# Patient Record
Sex: MALE | Race: WHITE | NOT HISPANIC OR LATINO | URBAN - METROPOLITAN AREA
[De-identification: names, ages, dates, MRNs, and addresses within clinical notes are randomized per-mention and may not be internally consistent; named-entity substitution may affect disease eponyms.]

---

## 2018-06-22 ENCOUNTER — EMERGENCY (EMERGENCY)
Facility: HOSPITAL | Age: 68
LOS: 0 days | Discharge: HOME | End: 2018-06-22
Attending: EMERGENCY MEDICINE | Admitting: INTERNAL MEDICINE

## 2018-06-22 VITALS — SYSTOLIC BLOOD PRESSURE: 167 MMHG | DIASTOLIC BLOOD PRESSURE: 71 MMHG | HEART RATE: 59 BPM

## 2018-06-22 VITALS
RESPIRATION RATE: 20 BRPM | DIASTOLIC BLOOD PRESSURE: 90 MMHG | HEART RATE: 69 BPM | OXYGEN SATURATION: 95 % | TEMPERATURE: 100 F | SYSTOLIC BLOOD PRESSURE: 175 MMHG

## 2018-06-22 DIAGNOSIS — M62.81 MUSCLE WEAKNESS (GENERALIZED): ICD-10-CM

## 2018-06-22 DIAGNOSIS — Z79.899 OTHER LONG TERM (CURRENT) DRUG THERAPY: ICD-10-CM

## 2018-06-22 DIAGNOSIS — R42 DIZZINESS AND GIDDINESS: ICD-10-CM

## 2018-06-22 DIAGNOSIS — I10 ESSENTIAL (PRIMARY) HYPERTENSION: ICD-10-CM

## 2018-06-22 DIAGNOSIS — E78.00 PURE HYPERCHOLESTEROLEMIA, UNSPECIFIED: ICD-10-CM

## 2018-06-22 DIAGNOSIS — E78.4 OTHER HYPERLIPIDEMIA: ICD-10-CM

## 2018-06-22 DIAGNOSIS — Z88.0 ALLERGY STATUS TO PENICILLIN: ICD-10-CM

## 2018-06-22 DIAGNOSIS — F17.200 NICOTINE DEPENDENCE, UNSPECIFIED, UNCOMPLICATED: ICD-10-CM

## 2018-06-22 LAB
ALBUMIN SERPL ELPH-MCNC: 4.1 G/DL — SIGNIFICANT CHANGE UP (ref 3.5–5.2)
ALP SERPL-CCNC: 56 U/L — SIGNIFICANT CHANGE UP (ref 30–115)
ALT FLD-CCNC: 18 U/L — SIGNIFICANT CHANGE UP (ref 0–41)
ANION GAP SERPL CALC-SCNC: 12 MMOL/L — SIGNIFICANT CHANGE UP (ref 7–14)
APPEARANCE UR: CLEAR — SIGNIFICANT CHANGE UP
AST SERPL-CCNC: 14 U/L — SIGNIFICANT CHANGE UP (ref 0–41)
BASOPHILS # BLD AUTO: 0.03 K/UL — SIGNIFICANT CHANGE UP (ref 0–0.2)
BASOPHILS NFR BLD AUTO: 0.5 % — SIGNIFICANT CHANGE UP (ref 0–1)
BILIRUB SERPL-MCNC: 0.4 MG/DL — SIGNIFICANT CHANGE UP (ref 0.2–1.2)
BILIRUB UR-MCNC: NEGATIVE — SIGNIFICANT CHANGE UP
BUN SERPL-MCNC: 15 MG/DL — SIGNIFICANT CHANGE UP (ref 10–20)
CALCIUM SERPL-MCNC: 9 MG/DL — SIGNIFICANT CHANGE UP (ref 8.5–10.1)
CHLORIDE SERPL-SCNC: 99 MMOL/L — SIGNIFICANT CHANGE UP (ref 98–110)
CK SERPL-CCNC: 73 U/L — SIGNIFICANT CHANGE UP (ref 0–225)
CO2 SERPL-SCNC: 30 MMOL/L — SIGNIFICANT CHANGE UP (ref 17–32)
COLOR SPEC: YELLOW — SIGNIFICANT CHANGE UP
CREAT SERPL-MCNC: 0.8 MG/DL — SIGNIFICANT CHANGE UP (ref 0.7–1.5)
DIFF PNL FLD: NEGATIVE — SIGNIFICANT CHANGE UP
EOSINOPHIL # BLD AUTO: 0.05 K/UL — SIGNIFICANT CHANGE UP (ref 0–0.7)
EOSINOPHIL NFR BLD AUTO: 0.8 % — SIGNIFICANT CHANGE UP (ref 0–8)
GLUCOSE SERPL-MCNC: 93 MG/DL — SIGNIFICANT CHANGE UP (ref 70–99)
GLUCOSE UR QL: NEGATIVE MG/DL — SIGNIFICANT CHANGE UP
HCT VFR BLD CALC: 48.5 % — SIGNIFICANT CHANGE UP (ref 42–52)
HGB BLD-MCNC: 17 G/DL — SIGNIFICANT CHANGE UP (ref 14–18)
IMM GRANULOCYTES NFR BLD AUTO: 0.5 % — HIGH (ref 0.1–0.3)
KETONES UR-MCNC: NEGATIVE — SIGNIFICANT CHANGE UP
LEUKOCYTE ESTERASE UR-ACNC: NEGATIVE — SIGNIFICANT CHANGE UP
LYMPHOCYTES # BLD AUTO: 1.5 K/UL — SIGNIFICANT CHANGE UP (ref 1.2–3.4)
LYMPHOCYTES # BLD AUTO: 25.2 % — SIGNIFICANT CHANGE UP (ref 20.5–51.1)
MCHC RBC-ENTMCNC: 29.7 PG — SIGNIFICANT CHANGE UP (ref 27–31)
MCHC RBC-ENTMCNC: 35.1 G/DL — SIGNIFICANT CHANGE UP (ref 32–37)
MCV RBC AUTO: 84.8 FL — SIGNIFICANT CHANGE UP (ref 80–94)
MONOCYTES # BLD AUTO: 0.42 K/UL — SIGNIFICANT CHANGE UP (ref 0.1–0.6)
MONOCYTES NFR BLD AUTO: 7 % — SIGNIFICANT CHANGE UP (ref 1.7–9.3)
NEUTROPHILS # BLD AUTO: 3.93 K/UL — SIGNIFICANT CHANGE UP (ref 1.4–6.5)
NEUTROPHILS NFR BLD AUTO: 66 % — SIGNIFICANT CHANGE UP (ref 42.2–75.2)
NITRITE UR-MCNC: NEGATIVE — SIGNIFICANT CHANGE UP
PH UR: 7 — SIGNIFICANT CHANGE UP (ref 5–8)
PLATELET # BLD AUTO: 168 K/UL — SIGNIFICANT CHANGE UP (ref 130–400)
POTASSIUM SERPL-MCNC: 3.8 MMOL/L — SIGNIFICANT CHANGE UP (ref 3.5–5)
POTASSIUM SERPL-SCNC: 3.8 MMOL/L — SIGNIFICANT CHANGE UP (ref 3.5–5)
PROT SERPL-MCNC: 6.6 G/DL — SIGNIFICANT CHANGE UP (ref 6–8)
PROT UR-MCNC: NEGATIVE MG/DL — SIGNIFICANT CHANGE UP
RBC # BLD: 5.72 M/UL — SIGNIFICANT CHANGE UP (ref 4.7–6.1)
RBC # FLD: 12.7 % — SIGNIFICANT CHANGE UP (ref 11.5–14.5)
SODIUM SERPL-SCNC: 141 MMOL/L — SIGNIFICANT CHANGE UP (ref 135–146)
SP GR SPEC: 1.01 — SIGNIFICANT CHANGE UP (ref 1.01–1.03)
TROPONIN T SERPL-MCNC: <0.01 NG/ML — SIGNIFICANT CHANGE UP
UROBILINOGEN FLD QL: 0.2 MG/DL — SIGNIFICANT CHANGE UP (ref 0.2–0.2)
WBC # BLD: 5.96 K/UL — SIGNIFICANT CHANGE UP (ref 4.8–10.8)
WBC # FLD AUTO: 5.96 K/UL — SIGNIFICANT CHANGE UP (ref 4.8–10.8)

## 2018-06-22 RX ORDER — SODIUM CHLORIDE 9 MG/ML
1000 INJECTION, SOLUTION INTRAVENOUS ONCE
Qty: 0 | Refills: 0 | Status: COMPLETED | OUTPATIENT
Start: 2018-06-22 | End: 2018-06-22

## 2018-06-22 RX ADMIN — SODIUM CHLORIDE 1000 MILLILITER(S): 9 INJECTION, SOLUTION INTRAVENOUS at 13:35

## 2018-06-22 NOTE — ED PROVIDER NOTE - PROGRESS NOTE DETAILS
ATTENDING NOTE: 66 y/o male with an pmhx of HTN, high cholesterol, (+) smoker, presents for dizziness described as feeling off balanced happened while driving, and weakness in legs described as shaking sensation then dizziness came on subsided. Reports experienced dizziness x few days, intermittent but today worried him while driving so came to the ED. denies fever, chills, n/v, cp, sob, pleuritic cp, palpitations, diaphoresis, cough, tinnitus, ear pain, hearing loss, neck pain/stiffness, back pain, photophobia/phonophobia, blurry vision/visual changes, abd pain, diarrhea, constipation, melena/brbpr, urinary symptoms, weakness, numbness/tingling, HA, syncope, sick contacts, recent travel or rash. never had anything like this before. on exam: wdwn male sitting on stretcher in nad, no rash, no signs of trauma, PERRL, EOM intact, no nystagmus, TM's visualzied b/l w/ good cone of light, no erythema or effusions, no cerumen impaction, mmm, neck supple, no spinous ttp to neck or back, FROM, no palpable shelves or step offs, no meningeal signs, regular rate, radial pulses 2/4 b/l, ctabl w/ breath sounds present b/l, no wheezing or crackles, good air exchange, good respiratory effort, no accessory muscle use, no tachypnea, no stridor, bs present throughout all 4 quadrants, abd soft, nd, nt, no rebound tenderness or guarding, no cvat, FROM of upper an lower ext, no calf pain/swelling/erythema, AAOx3. Motor 5/5 and sensation intact throughout upper and lower ext. CN II-XII intact. No facial droop or slurring of speech. (-) Pronator (-) Romberg, no dysmetria w/ ftn or rapid alternating fine movements, heel to shin intact, ambulating with no ataxia or difficulty, but reports dizziness when trying to go stand. Plan: EKG, CXR, labs, urine, CT head, Neurology consult, and will reassess. Spoke to Dr. Santillan from neurology, states he does not believe patient's symptoms are neurological, advised to work patient up for ACS related lightheadedness. pt eating, neurology report do not belive this is neurological and state if pt is still symptomatic recommend acs workup, pt aware, on lopressor at home, states HR usually in 50-60, will get repeat vitals and reassess. Patient tolerated PO in ed. Patient states he is feeling completely fine, has been asymptomatic in ER. Patient ambulated well in ER, not complaining of any symptoms. Repeat orthostatic vital signs were normal, no significant drop in BP or increase in HR noted. Will discharge with ENT follow up. ATTENDING NOTE: 66 y/o male with an pmhx of HTN, high cholesterol, (+) smoker, presents for dizziness described as feeling off balanced happened while driving, and weakness in legs described as shaking sensation then dizziness came on subsided. Reports experienced dizziness x few days, intermittent but today worried him while driving so came to the ED. denies fever, chills, n/v, cp, sob, pleuritic cp, palpitations, diaphoresis, cough, tinnitus, ear pain, hearing loss, neck pain/stiffness, back pain, photophobia/phonophobia, blurry vision/visual changes, abd pain, diarrhea, constipation, melena/brbpr, urinary symptoms, weakness, numbness/tingling, HA, syncope, sick contacts, recent travel or rash. on exam: wdwn male sitting on stretcher in nad, no rash, no signs of trauma, PERRL, EOM intact, no nystagmus, TM's visualzied b/l w/ good cone of light, no erythema or effusions, no cerumen impaction, mmm, neck supple, no spinous ttp to neck or back, FROM, no palpable shelves or step offs, no meningeal signs, regular rate, radial pulses 2/4 b/l, ctabl w/ breath sounds present b/l, no wheezing or crackles, good air exchange, good respiratory effort, no accessory muscle use, no tachypnea, no stridor, bs present throughout all 4 quadrants, abd soft, nd, nt, no rebound tenderness or guarding, no cvat, FROM of upper an lower ext, no calf pain/swelling/erythema, AAOx3. Motor 5/5 and sensation intact throughout upper and lower ext. CN II-XII intact. No facial droop or slurring of speech. (-) Pronator (-) Romberg, no dysmetria w/ ftn or rapid alternating fine movements, heel to shin intact, ambulating with no ataxia or difficulty, but reports dizziness when trying to go stand. Plan: EKG, CXR, labs, urine, CT head, Neurology consult, and will reassess.

## 2018-06-22 NOTE — ED PROVIDER NOTE - PHYSICAL EXAMINATION
CONSTITUTIONAL: Well-developed; well-nourished; elderly male, in no acute distress.   SKIN: warm, dry  HEAD: Normocephalic; atraumatic.  EYES: no conj injection  CARD: S1, S2 normal; no murmurs, gallops, or rubs. Regular rate and rhythm.   RESP: No wheezes, rales or rhonchi.  ABD: soft ntnd  EXT: Normal ROM.  No clubbing, cyanosis or edema.   NEURO: Alert, oriented, grossly unremarkable; CN II-XII intact, 5/5 motor strength, neurovascularly intact, no pronator drift, negative finger to nose test, gait normal  PSYCH: Cooperative, appropriate.

## 2018-06-22 NOTE — ED PROVIDER NOTE - NS ED ROS FT
Constitutional: See HPI.  Eyes: No visual changes, eye pain or discharge.  ENMT: No hearing changes, pain, discharge or infections. No neck pain or stiffness.  Cardiac: No chest pain, SOB or edema. No chest pain with exertion.  Respiratory: No cough or respiratory distress.   GI: No nausea, vomiting, diarrhea or abdominal pain.  : No dysuria, frequency or burning.  MS: No myalgia, muscle weakness, joint pain or back pain.  Neuro: +off balance; No headache or weakness. No LOC.  Skin: No skin rash.

## 2018-06-22 NOTE — ED ADULT NURSE NOTE - OBJECTIVE STATEMENT
pt presents to the ED with c/o having dizziness for 2-3 days now and having an episode while driving today. pt denies dizziness at this time. pt also denies cp/sob, fevers/chills, n/v/d.

## 2018-06-22 NOTE — ED PROVIDER NOTE - MEDICAL DECISION MAKING DETAILS
pt feeling much better, neurolgy report no need for admission at this time, pt feeling much better, no focal deficits, given neruology follow up, will follow up with cardiology as well, no cp no sob, has been aymptopmatic in the ed, ambulatory with repeat in orthostatics, aware of signs and symptoms to return for, reports will follow up.

## 2018-06-22 NOTE — ED PROVIDER NOTE - OBJECTIVE STATEMENT
68 y/o male with pmhx of HTN, DLD present with dizziness. Patient states he was driving earlier today, began coughing and his legs started shaking and felt off balance. Patient states this has been going on for couple of days intermittently. Denies fevers/chills, headache, vision changes, photophobia, chest pain, SOB, cough, n/v/d/abdominal pain, numbness/tingling, weakness.

## 2022-06-27 PROBLEM — E78.00 PURE HYPERCHOLESTEROLEMIA, UNSPECIFIED: Chronic | Status: ACTIVE | Noted: 2018-06-22

## 2022-06-27 PROBLEM — I10 ESSENTIAL (PRIMARY) HYPERTENSION: Chronic | Status: ACTIVE | Noted: 2018-06-22

## 2022-07-08 ENCOUNTER — OUTPATIENT (OUTPATIENT)
Dept: OUTPATIENT SERVICES | Facility: HOSPITAL | Age: 72
LOS: 1 days | Discharge: HOME | End: 2022-07-08

## 2022-07-08 ENCOUNTER — INPATIENT (INPATIENT)
Facility: HOSPITAL | Age: 72
LOS: 4 days | Discharge: HOME | End: 2022-07-13
Attending: INTERNAL MEDICINE | Admitting: INTERNAL MEDICINE

## 2022-07-08 VITALS
SYSTOLIC BLOOD PRESSURE: 165 MMHG | HEIGHT: 64 IN | OXYGEN SATURATION: 96 % | HEART RATE: 114 BPM | WEIGHT: 190.04 LBS | TEMPERATURE: 97 F | RESPIRATION RATE: 18 BRPM | DIASTOLIC BLOOD PRESSURE: 130 MMHG

## 2022-07-08 VITALS
SYSTOLIC BLOOD PRESSURE: 210 MMHG | HEIGHT: 64 IN | DIASTOLIC BLOOD PRESSURE: 110 MMHG | OXYGEN SATURATION: 96 % | RESPIRATION RATE: 18 BRPM | TEMPERATURE: 97 F | WEIGHT: 190.04 LBS | HEART RATE: 114 BPM

## 2022-07-08 DIAGNOSIS — Z01.818 ENCOUNTER FOR OTHER PREPROCEDURAL EXAMINATION: ICD-10-CM

## 2022-07-08 DIAGNOSIS — L72.3 SEBACEOUS CYST: ICD-10-CM

## 2022-07-08 DIAGNOSIS — Z98.890 OTHER SPECIFIED POSTPROCEDURAL STATES: Chronic | ICD-10-CM

## 2022-07-08 LAB
ALBUMIN SERPL ELPH-MCNC: 4.7 G/DL — SIGNIFICANT CHANGE UP (ref 3.5–5.2)
ALBUMIN SERPL ELPH-MCNC: 4.7 G/DL — SIGNIFICANT CHANGE UP (ref 3.5–5.2)
ALP SERPL-CCNC: 75 U/L — SIGNIFICANT CHANGE UP (ref 30–115)
ALP SERPL-CCNC: 77 U/L — SIGNIFICANT CHANGE UP (ref 30–115)
ALT FLD-CCNC: 26 U/L — SIGNIFICANT CHANGE UP (ref 0–41)
ALT FLD-CCNC: 26 U/L — SIGNIFICANT CHANGE UP (ref 0–41)
ANION GAP SERPL CALC-SCNC: 13 MMOL/L — SIGNIFICANT CHANGE UP (ref 7–14)
ANION GAP SERPL CALC-SCNC: 15 MMOL/L — HIGH (ref 7–14)
APTT BLD: 34.2 SEC — SIGNIFICANT CHANGE UP (ref 27–39.2)
AST SERPL-CCNC: 23 U/L — SIGNIFICANT CHANGE UP (ref 0–41)
AST SERPL-CCNC: 25 U/L — SIGNIFICANT CHANGE UP (ref 0–41)
BASOPHILS # BLD AUTO: 0.04 K/UL — SIGNIFICANT CHANGE UP (ref 0–0.2)
BASOPHILS # BLD AUTO: 0.04 K/UL — SIGNIFICANT CHANGE UP (ref 0–0.2)
BASOPHILS NFR BLD AUTO: 0.4 % — SIGNIFICANT CHANGE UP (ref 0–1)
BASOPHILS NFR BLD AUTO: 0.4 % — SIGNIFICANT CHANGE UP (ref 0–1)
BILIRUB SERPL-MCNC: 0.8 MG/DL — SIGNIFICANT CHANGE UP (ref 0.2–1.2)
BILIRUB SERPL-MCNC: 0.8 MG/DL — SIGNIFICANT CHANGE UP (ref 0.2–1.2)
BUN SERPL-MCNC: 18 MG/DL — SIGNIFICANT CHANGE UP (ref 10–20)
BUN SERPL-MCNC: 18 MG/DL — SIGNIFICANT CHANGE UP (ref 10–20)
CALCIUM SERPL-MCNC: 9 MG/DL — SIGNIFICANT CHANGE UP (ref 8.5–10.1)
CALCIUM SERPL-MCNC: 9.3 MG/DL — SIGNIFICANT CHANGE UP (ref 8.5–10.1)
CHLORIDE SERPL-SCNC: 102 MMOL/L — SIGNIFICANT CHANGE UP (ref 98–110)
CHLORIDE SERPL-SCNC: 103 MMOL/L — SIGNIFICANT CHANGE UP (ref 98–110)
CO2 SERPL-SCNC: 24 MMOL/L — SIGNIFICANT CHANGE UP (ref 17–32)
CO2 SERPL-SCNC: 25 MMOL/L — SIGNIFICANT CHANGE UP (ref 17–32)
CREAT SERPL-MCNC: 0.9 MG/DL — SIGNIFICANT CHANGE UP (ref 0.7–1.5)
CREAT SERPL-MCNC: 0.9 MG/DL — SIGNIFICANT CHANGE UP (ref 0.7–1.5)
EGFR: 90 ML/MIN/1.73M2 — SIGNIFICANT CHANGE UP
EGFR: 90 ML/MIN/1.73M2 — SIGNIFICANT CHANGE UP
EOSINOPHIL # BLD AUTO: 0.05 K/UL — SIGNIFICANT CHANGE UP (ref 0–0.7)
EOSINOPHIL # BLD AUTO: 0.07 K/UL — SIGNIFICANT CHANGE UP (ref 0–0.7)
EOSINOPHIL NFR BLD AUTO: 0.5 % — SIGNIFICANT CHANGE UP (ref 0–8)
EOSINOPHIL NFR BLD AUTO: 0.7 % — SIGNIFICANT CHANGE UP (ref 0–8)
GLUCOSE SERPL-MCNC: 106 MG/DL — HIGH (ref 70–99)
GLUCOSE SERPL-MCNC: 96 MG/DL — SIGNIFICANT CHANGE UP (ref 70–99)
HCT VFR BLD CALC: 50.9 % — SIGNIFICANT CHANGE UP (ref 42–52)
HCT VFR BLD CALC: 51.7 % — SIGNIFICANT CHANGE UP (ref 42–52)
HGB BLD-MCNC: 17.3 G/DL — SIGNIFICANT CHANGE UP (ref 14–18)
HGB BLD-MCNC: 17.7 G/DL — SIGNIFICANT CHANGE UP (ref 14–18)
IMM GRANULOCYTES NFR BLD AUTO: 0.4 % — HIGH (ref 0.1–0.3)
IMM GRANULOCYTES NFR BLD AUTO: 0.5 % — HIGH (ref 0.1–0.3)
INR BLD: 1 RATIO — SIGNIFICANT CHANGE UP (ref 0.65–1.3)
LYMPHOCYTES # BLD AUTO: 1.78 K/UL — SIGNIFICANT CHANGE UP (ref 1.2–3.4)
LYMPHOCYTES # BLD AUTO: 18.3 % — LOW (ref 20.5–51.1)
LYMPHOCYTES # BLD AUTO: 2.01 K/UL — SIGNIFICANT CHANGE UP (ref 1.2–3.4)
LYMPHOCYTES # BLD AUTO: 21.2 % — SIGNIFICANT CHANGE UP (ref 20.5–51.1)
MAGNESIUM SERPL-MCNC: 1.9 MG/DL — SIGNIFICANT CHANGE UP (ref 1.8–2.4)
MCHC RBC-ENTMCNC: 28.8 PG — SIGNIFICANT CHANGE UP (ref 27–31)
MCHC RBC-ENTMCNC: 29.3 PG — SIGNIFICANT CHANGE UP (ref 27–31)
MCHC RBC-ENTMCNC: 33.5 G/DL — SIGNIFICANT CHANGE UP (ref 32–37)
MCHC RBC-ENTMCNC: 34.8 G/DL — SIGNIFICANT CHANGE UP (ref 32–37)
MCV RBC AUTO: 84.3 FL — SIGNIFICANT CHANGE UP (ref 80–94)
MCV RBC AUTO: 86 FL — SIGNIFICANT CHANGE UP (ref 80–94)
MONOCYTES # BLD AUTO: 0.65 K/UL — HIGH (ref 0.1–0.6)
MONOCYTES # BLD AUTO: 0.67 K/UL — HIGH (ref 0.1–0.6)
MONOCYTES NFR BLD AUTO: 6.9 % — SIGNIFICANT CHANGE UP (ref 1.7–9.3)
MONOCYTES NFR BLD AUTO: 6.9 % — SIGNIFICANT CHANGE UP (ref 1.7–9.3)
NEUTROPHILS # BLD AUTO: 6.65 K/UL — HIGH (ref 1.4–6.5)
NEUTROPHILS # BLD AUTO: 7.12 K/UL — HIGH (ref 1.4–6.5)
NEUTROPHILS NFR BLD AUTO: 70.4 % — SIGNIFICANT CHANGE UP (ref 42.2–75.2)
NEUTROPHILS NFR BLD AUTO: 73.4 % — SIGNIFICANT CHANGE UP (ref 42.2–75.2)
NRBC # BLD: 0 /100 WBCS — SIGNIFICANT CHANGE UP (ref 0–0)
NRBC # BLD: SIGNIFICANT CHANGE UP /100 WBCS (ref 0–0)
NRBC # BLD: SIGNIFICANT CHANGE UP /100 WBCS (ref 0–0)
NT-PROBNP SERPL-SCNC: 1244 PG/ML — HIGH (ref 0–300)
PLATELET # BLD AUTO: 163 K/UL — SIGNIFICANT CHANGE UP (ref 130–400)
PLATELET # BLD AUTO: 176 K/UL — SIGNIFICANT CHANGE UP (ref 130–400)
POTASSIUM SERPL-MCNC: 4 MMOL/L — SIGNIFICANT CHANGE UP (ref 3.5–5)
POTASSIUM SERPL-MCNC: 4.2 MMOL/L — SIGNIFICANT CHANGE UP (ref 3.5–5)
POTASSIUM SERPL-SCNC: 4 MMOL/L — SIGNIFICANT CHANGE UP (ref 3.5–5)
POTASSIUM SERPL-SCNC: 4.2 MMOL/L — SIGNIFICANT CHANGE UP (ref 3.5–5)
PROT SERPL-MCNC: 7.5 G/DL — SIGNIFICANT CHANGE UP (ref 6–8)
PROT SERPL-MCNC: 7.6 G/DL — SIGNIFICANT CHANGE UP (ref 6–8)
PROTHROM AB SERPL-ACNC: 11.5 SEC — SIGNIFICANT CHANGE UP (ref 9.95–12.87)
RBC # BLD: 6.01 M/UL — SIGNIFICANT CHANGE UP (ref 4.7–6.1)
RBC # BLD: 6.04 M/UL — SIGNIFICANT CHANGE UP (ref 4.7–6.1)
RBC # FLD: 12.7 % — SIGNIFICANT CHANGE UP (ref 11.5–14.5)
RBC # FLD: 12.8 % — SIGNIFICANT CHANGE UP (ref 11.5–14.5)
SARS-COV-2 RNA SPEC QL NAA+PROBE: SIGNIFICANT CHANGE UP
SODIUM SERPL-SCNC: 141 MMOL/L — SIGNIFICANT CHANGE UP (ref 135–146)
SODIUM SERPL-SCNC: 141 MMOL/L — SIGNIFICANT CHANGE UP (ref 135–146)
TROPONIN T SERPL-MCNC: 0.01 NG/ML — SIGNIFICANT CHANGE UP
WBC # BLD: 9.46 K/UL — SIGNIFICANT CHANGE UP (ref 4.8–10.8)
WBC # BLD: 9.71 K/UL — SIGNIFICANT CHANGE UP (ref 4.8–10.8)
WBC # FLD AUTO: 9.46 K/UL — SIGNIFICANT CHANGE UP (ref 4.8–10.8)
WBC # FLD AUTO: 9.71 K/UL — SIGNIFICANT CHANGE UP (ref 4.8–10.8)

## 2022-07-08 PROCEDURE — 99285 EMERGENCY DEPT VISIT HI MDM: CPT | Mod: GC

## 2022-07-08 PROCEDURE — 93010 ELECTROCARDIOGRAM REPORT: CPT | Mod: 76

## 2022-07-08 PROCEDURE — 93010 ELECTROCARDIOGRAM REPORT: CPT

## 2022-07-08 PROCEDURE — 71045 X-RAY EXAM CHEST 1 VIEW: CPT | Mod: 26

## 2022-07-08 PROCEDURE — 99223 1ST HOSP IP/OBS HIGH 75: CPT

## 2022-07-08 RX ORDER — METOPROLOL TARTRATE 50 MG
0 TABLET ORAL
Qty: 0 | Refills: 0 | DISCHARGE

## 2022-07-08 RX ORDER — LOSARTAN POTASSIUM 100 MG/1
100 TABLET, FILM COATED ORAL DAILY
Refills: 0 | Status: DISCONTINUED | OUTPATIENT
Start: 2022-07-08 | End: 2022-07-13

## 2022-07-08 RX ORDER — FLUTICASONE FUROATE AND VILANTEROL TRIFENATATE 100; 25 UG/1; UG/1
1 POWDER RESPIRATORY (INHALATION)
Qty: 0 | Refills: 0 | DISCHARGE

## 2022-07-08 RX ORDER — ROSUVASTATIN CALCIUM 5 MG/1
0 TABLET ORAL
Qty: 0 | Refills: 0 | DISCHARGE

## 2022-07-08 RX ORDER — LOSARTAN POTASSIUM 100 MG/1
1 TABLET, FILM COATED ORAL
Qty: 0 | Refills: 0 | DISCHARGE

## 2022-07-08 RX ORDER — METOPROLOL TARTRATE 50 MG
5 TABLET ORAL ONCE
Refills: 0 | Status: COMPLETED | OUTPATIENT
Start: 2022-07-08 | End: 2022-07-08

## 2022-07-08 RX ORDER — ATORVASTATIN CALCIUM 80 MG/1
20 TABLET, FILM COATED ORAL AT BEDTIME
Refills: 0 | Status: DISCONTINUED | OUTPATIENT
Start: 2022-07-08 | End: 2022-07-13

## 2022-07-08 RX ORDER — ROSUVASTATIN CALCIUM 5 MG/1
1 TABLET ORAL
Qty: 0 | Refills: 0 | DISCHARGE

## 2022-07-08 RX ORDER — APIXABAN 2.5 MG/1
5 TABLET, FILM COATED ORAL EVERY 12 HOURS
Refills: 0 | Status: DISCONTINUED | OUTPATIENT
Start: 2022-07-08 | End: 2022-07-10

## 2022-07-08 RX ORDER — APIXABAN 2.5 MG/1
5 TABLET, FILM COATED ORAL ONCE
Refills: 0 | Status: COMPLETED | OUTPATIENT
Start: 2022-07-08 | End: 2022-07-08

## 2022-07-08 RX ORDER — METOPROLOL TARTRATE 50 MG
50 TABLET ORAL
Refills: 0 | Status: DISCONTINUED | OUTPATIENT
Start: 2022-07-08 | End: 2022-07-11

## 2022-07-08 RX ADMIN — Medication 50 MILLIGRAM(S): at 20:40

## 2022-07-08 RX ADMIN — Medication 5 MILLIGRAM(S): at 17:17

## 2022-07-08 RX ADMIN — LOSARTAN POTASSIUM 100 MILLIGRAM(S): 100 TABLET, FILM COATED ORAL at 20:40

## 2022-07-08 RX ADMIN — APIXABAN 5 MILLIGRAM(S): 2.5 TABLET, FILM COATED ORAL at 19:25

## 2022-07-08 RX ADMIN — ATORVASTATIN CALCIUM 20 MILLIGRAM(S): 80 TABLET, FILM COATED ORAL at 21:14

## 2022-07-08 NOTE — ED PROVIDER NOTE - CLINICAL SUMMARY MEDICAL DECISION MAKING FREE TEXT BOX
70-year-old male with past medical history hypertension, hyperlipidemia who presents to the ER for medicine clinic for atrial fibrillation with RVR, frequent PVCs.   On arrival patient in atrial flutter with frequent PVCs.  EKG, labs and imaging reviewed.  EP was consulted who evaluated the patient. CHADS-VASC 3. Patient was started on Eliquis and admitted for further management.

## 2022-07-08 NOTE — H&P PST ADULT - NSICDXPASTMEDICALHX_GEN_ALL_CORE_FT
PAST MEDICAL HISTORY:  Edema of both ankles     High cholesterol     HTN (hypertension)     Obese     Skin cyst     SOB (shortness of breath)

## 2022-07-08 NOTE — H&P ADULT - ASSESSMENT
71 y M HTN, DLD and afib/flutter noncompliant for medication, HTN, presurgical testing site here at [facility] for neck cyst removal and was incidentally found to have aflutter on ekg at their facility.     #Aflutter with RVR  #Afib   - Patient on home metoprolol   - Preop workup with incidental finding of Aflutter >>> in ED HR 120s  - Received metoprolol in ED   - Cardio on board: Eliquis + metoprolol 50 BID; possible DCCV   -fu lipid profile, HbA1c, TSH, TTE     #HTN  - c/w Losartan 100mg  - monitor BP     #Astham vs COPD??  - Patient has a history of heavy smoker, did not mention lung issues   - was on home Breoellipta     #DLD  - C/w atorvastatin   - lipid profile in am     #DVT ppx: eliquis  #GI ppx: pantoprazole   #Code status: full code   #Diet: DASH/TLC  #Disposition: from home, admit to telemetry for monitoring     Med/rec: confirmed with pharmacy  71 y M HTN, DLD and afib/flutter noncompliant for medication, HTN, presurgical testing site here at [facility] for neck cyst removal and was incidentally found to have aflutter on ekg at their facility.     #Aflutter with RVR  #Afib   - Patient on home metoprolol   - Preop workup with incidental finding of Aflutter >>> in ED HR 120s  - Received metoprolol in ED   - Cardio on board: Eliquis + metoprolol 50 BID; possible DCCV   -fu lipid profile, HbA1c, TSH, TTE     #HTN  - c/w Losartan 100mg  - monitor BP     #LE edema  - 1 + pitting edema L>R  - Duplex LE   - TTE to r/o HF     #Astham vs COPD??  - Patient has a history of heavy smoker, did not mention lung issues   - was on home Breoellipta     #DLD  - C/w atorvastatin   - lipid profile in am     #DVT ppx: eliquis  #GI ppx: pantoprazole   #Code status: full code   #Diet: DASH/TLC  #Disposition: from home, admit to telemetry for monitoring     Med/rec: confirmed with pharmacy

## 2022-07-08 NOTE — CONSULT NOTE ADULT - ASSESSMENT
Assessment: 70 yo M with hx of HTN and HLD sent to ED [facility] for new onset atrial flutter. Patient asymptomatic    Impression:  New Onset AFL  CHADSVASC = 1  PVCs  HTN  HLD    Plan:  - Admit to telemetry  - Start Eliquis 5mg Q12h for stroke prevention  - Metoprolol 50mg BID for rate control  - 2D Echo  - Check TSH, free T4  - Recommend ischemic workup given frequent PVCs  - Monitor electrolytes, maintain WNL  - Plan for ANTHONY/DCCV on Monday (NPO after midnight Sunday)  - Will follow

## 2022-07-08 NOTE — H&P PST ADULT - REASON FOR ADMISSION
Case Type: OP Block TimeSuite: OR NorthProceduralist: Leonardo Bar  Confirmed Surgery DateTime: 07-   Procedure: Excision of cyst of posterior neck  ERP?: NoLaterality: N/ALength of Procedure: 30 Minutes  Anesthesia Type: Local Standby  Vaccination Type: Pzifer  1st Dose received on: 02-  2nd Dose received on: 03-

## 2022-07-08 NOTE — CONSULT NOTE ADULT - NS ATTEND AMEND GEN_ALL_CORE FT
Pt seen 7/8    Cardiologist: None    70 yo M with history of HTN, HL, admitted for asymptomatic new onset AFlutter from [facility]. Pt was at [facility] for pretesting prior to lipoma resection. Admits to occasional dyspnea but felt it was from lungs. Occ takes inhaler.     Impression:  New Onset AFL with CHADS VASc at least 2 (HTN, Age > 65)  PVCs    Recommend  - Admit to tele  - Start Eliquis 5mg PO BID   - Agree with BB  - Check 2D echo  - Check TFTs.   - Cardiology consult for ischemic work up for frequent PVCs and then possibly ANTHONY/CV

## 2022-07-08 NOTE — ED PROVIDER NOTE - OBJECTIVE STATEMENT
71 y M HTN, DLD and afib/flutter? c/o HTN and aflutter. pt was at the presurgical testing site here at [facility] for neck cyst removal and was incidentally found to have HTN and aflutter on ekg at their facility. pt states that he only take metoprolol and crestor; pt states that he otherwise feels well and does not have sxs. Of note, pt was instructed to see EP but never followed up. Patient is a poor historian.

## 2022-07-08 NOTE — H&P PST ADULT - NS PRO ABUSE SCREEN AFRAID ANYONE YN
Calling regarding: Pt is going to have labs done tomorrow but wonders if there is any other lab work that needs to be done also. Pt states she has been home from Hospital for a week and isn't feeling good. Pls advise      Caller: Nila    Timeframe for callback: today    Pharmacy information verified:  No       Phone number to be reached at: HOME: [phone]         no

## 2022-07-08 NOTE — ED PROVIDER NOTE - ATTENDING CONTRIBUTION TO CARE
70-year-old male with past medical history hypertension, hyperlipidemia who presents to the ER for medicine clinic for atrial fibrillation with RVR, frequent PVCs.  He was seen for preop testing for to have a cyst moved on his neck.  On arrival he reported dizziness, bilateral ankle edema as well as shortness of breath for the past 5 to 6 months.  Patient was actually hypertensive outpatient.  He currently denies chest pain, focal weakness or numbness, productive cough.    Vitals noted- , triage note reviewed.    Gen - NAD, Head - NCAT, Pharynx - clear, MMM, Heart - tachycardic, irregularly irregular, no m/g/r, Lungs - CTAB, no w/c/r, Abdomen - soft, NT, ND, Skin - No rash, Extremities - FROM, 2+ pitting edema b/l LE, erythema, ecchymosis, brisk cap refill, Neuro - A&O x3, equal strength and sensation, non-focal exam    a/p:  AF with rvr, rate ~110  cont monitoring on tele  labs, cxr, ep consult  reassess

## 2022-07-08 NOTE — CONSULT NOTE ADULT - SUBJECTIVE AND OBJECTIVE BOX
Patient is a 71y old  Male who presents with a chief complaint of new onset AFL    HPI: Patient is a 72 yo M with hx of HTN, HLD who was [facility] for new onset atrial flutter. Pt scheduled to have lipoma resection and while in [facility] found to be in new onset AFL at rate 120s bpm and sent to the ED. Patient denies any palpitations, dizziness, CP or syncope. He had stress test > 10 years ago but has never since followed up with a cardiologist and only sees PMD Dr Fletcher. He takes Metoprolol 50mg and Lisinopril 100mg daily for HTN but admits to not always being compliant with this. No hx of CVA, TIA or CAD.    PAST MEDICAL & SURGICAL HISTORY:  HTN (hypertension)      High cholesterol      Skin cyst      Obese      SOB (shortness of breath)      Edema of both ankles      H/O right knee surgery    PREVIOUS DIAGNOSTIC TESTING:      ECHO  FINDINGS:    STRESS  FINDINGS:    CATHETERIZATION  FINDINGS:    ELECTROPHYSIOLOGY STUDY  FINDINGS:    CAROTID ULTRASOUND:  FINDINGS    VENOUS DUPLEX SCAN:  FINDINGS:    CHEST CT PULMONARY ANGIO with IV Contrast:  FINDINGS:    MEDICATIONS  (STANDING):  Metoprolol 50mg daily  Lisinopril 100mg daily    MEDICATIONS  (PRN):    FAMILY HISTORY: No significant hx    SOCIAL HISTORY: Quit smoking recently. No ETOH or illicit drug use    Past Surgical History: None    Allergies:  penicillin (Unknown)      REVIEW OF SYSTEMS:  CONSTITUTIONAL: No fever, weight loss, chills, shakes, or fatigue  RESPIRATORY: No cough, wheezing, hemoptysis, or shortness of breath  CARDIOVASCULAR: No chest pain, dyspnea, palpitations, dizziness, syncope, paroxysmal nocturnal dyspnea, orthopnea, or arm or leg swelling  GASTROINTESTINAL: No abdominal  or epigastric pain, nausea, vomiting, hematemesis, diarrhea, constipation, melena or bright red blood.  NEUROLOGICAL: No headaches, memory loss, slurred speech, limb weakness, loss of strength, numbness, or tremors  MUSCULOSKELETAL: No joint pain or swelling, muscle, back, or extremity pain      Vital Signs Last 24 Hrs  T(C): 36.2 (08 Jul 2022 13:48), Max: 36.2 (08 Jul 2022 12:20)  T(F): 97.2 (08 Jul 2022 13:48), Max: 97.2 (08 Jul 2022 12:20)  HR: 118 (08 Jul 2022 14:12) (114 - 118)  BP: 182/132 (08 Jul 2022 14:12) (165/130 - 210/110)  BP(mean): 143 (08 Jul 2022 12:20) (143 - 143)  RR: 18 (08 Jul 2022 13:48) (18 - 18)  SpO2: 96% (08 Jul 2022 13:48) (96% - 96%)    Parameters below as of 08 Jul 2022 13:48  Patient On (Oxygen Delivery Method): room air        PHYSICAL EXAM:  GENERAL: In no apparent distress, well nourished, and hydrated.  NECK: Supple, No JVD   HEART: Irregular rate and rhythm; No murmurs, rubs, or gallops.  PULMONARY: Clear to auscultation and perfusion.  No rales, wheezing, or rhonchi bilaterally.  EXTREMITIES:  2+ Peripheral Pulses, no LE edema BL  NEUROLOGICAL: Grossly nonfocal      INTERPRETATION OF TELEMETRY: Atrial flutter 120 bpm with frequent PVCs    ECG:  < from: 12 Lead ECG (07.08.22 @ 15:27) >  Ventricular Rate 120 BPM    Atrial Rate 120 BPM    P-R Interval 150 ms    QRS Duration 100 ms    Q-T Interval 394 ms    QTC Calculation(Bazett) 556 ms    R Axis 86 degrees    T Axis -33 degrees    Diagnosis Line Atrial flutter with 2 to 1 block with frequent Premature ventricular complexes  Incomplete right bundle branch block  Marked ST abnormality, possible inferior subendocardial injury  Prolonged QT  Abnormal ECG    Confirmed by Brandyn Lima ([id number]) on 7/8/2022 5:05:10 PM    < end of copied text >      I&O's Detail      LABS:                        17.7   9.71  )-----------( 176      ( 08 Jul 2022 14:41 )             50.9     07-08    141  |  103  |  18  ----------------------------<  106<H>  4.2   |  25  |  0.9    Ca    9.3      08 Jul 2022 14:41  Mg     1.9     07-08    TPro  7.5  /  Alb  4.7  /  TBili  0.8  /  DBili  x   /  AST  23  /  ALT  26  /  AlkPhos  77  07-08    CARDIAC MARKERS ( 08 Jul 2022 14:41 )  x     / 0.01 ng/mL / x     / x     / x          PT/INR - ( 08 Jul 2022 12:20 )   PT: 11.50 sec;   INR: 1.00 ratio         PTT - ( 08 Jul 2022 12:20 )  PTT:34.2 sec    BNPSerum Pro-Brain Natriuretic Peptide: 1244 pg/mL (07-08 @ 14:41)    I&O's Detail    Daily Height in cm: 162.56 (08 Jul 2022 13:48)    Daily     RADIOLOGY & ADDITIONAL STUDIES: Patient is a 71y old  Male who presents with a chief complaint of new onset AFL    HPI: Patient is a 70 yo M with hx of HTN, HLD who was [facility] ED [facility] for new onset atrial flutter. Pt scheduled to have lipoma resection and while in [facility] found to be in new onset AFL at rate 120s bpm and sent to the ED. Patient denies any palpitations, dizziness, CP or syncope. He had stress test > 10 years ago but has never since followed up with a cardiologist and only sees PMD Dr Fletcher. He takes Metoprolol 50mg and Lisinopril 100mg daily for HTN but admits to not always being compliant with this. No hx of CVA, TIA or CAD.    PAST MEDICAL & SURGICAL HISTORY:  HTN (hypertension)      High cholesterol      Skin cyst      Obese      SOB (shortness of breath)      Edema of both ankles      H/O right knee surgery    PREVIOUS DIAGNOSTIC TESTING:      ECHO  FINDINGS:    STRESS  FINDINGS:    CATHETERIZATION  FINDINGS:    ELECTROPHYSIOLOGY STUDY  FINDINGS:    CAROTID ULTRASOUND:  FINDINGS    VENOUS DUPLEX SCAN:  FINDINGS:    CHEST CT PULMONARY ANGIO with IV Contrast:  FINDINGS:    MEDICATIONS  (STANDING):  Metoprolol 50mg daily  Lisinopril 100mg daily    MEDICATIONS  (PRN):    FAMILY HISTORY: No significant hx    SOCIAL HISTORY: Quit smoking recently. No ETOH or illicit drug use    Past Surgical History: None    Allergies:  penicillin (Unknown)      REVIEW OF SYSTEMS:  CONSTITUTIONAL: No fever, weight loss, chills, shakes, or fatigue  RESPIRATORY: No cough, wheezing, hemoptysis, or shortness of breath  CARDIOVASCULAR: No chest pain, dyspnea, palpitations, dizziness, syncope, paroxysmal nocturnal dyspnea, orthopnea, or arm or leg swelling  GASTROINTESTINAL: No abdominal  or epigastric pain, nausea, vomiting, hematemesis, diarrhea, constipation, melena or bright red blood.  NEUROLOGICAL: No headaches, memory loss, slurred speech, limb weakness, loss of strength, numbness, or tremors  MUSCULOSKELETAL: No joint pain or swelling, muscle, back, or extremity pain      Vital Signs Last 24 Hrs  T(C): 36.2 (08 Jul 2022 13:48), Max: 36.2 (08 Jul 2022 12:20)  T(F): 97.2 (08 Jul 2022 13:48), Max: 97.2 (08 Jul 2022 12:20)  HR: 118 (08 Jul 2022 14:12) (114 - 118)  BP: 182/132 (08 Jul 2022 14:12) (165/130 - 210/110)  BP(mean): 143 (08 Jul 2022 12:20) (143 - 143)  RR: 18 (08 Jul 2022 13:48) (18 - 18)  SpO2: 96% (08 Jul 2022 13:48) (96% - 96%)    Parameters below as of 08 Jul 2022 13:48  Patient On (Oxygen Delivery Method): room air        PHYSICAL EXAM:  GENERAL: In no apparent distress, well nourished, and hydrated.  NECK: Supple  HEART: Irregular rate and rhythm; No murmurs, rubs, or gallops.  PULMONARY: Clear to auscultation and perfusion.  No rales, wheezing, or rhonchi bilaterally.  EXTREMITIES:  2+ Peripheral Pulses, no LE edema BL  NEUROLOGICAL: Grossly nonfocal      INTERPRETATION OF TELEMETRY: Atrial flutter 120 bpm with frequent PVCs    ECG:  < from: 12 Lead ECG (07.08.22 @ 15:27) >  Ventricular Rate 120 BPM    Atrial Rate 120 BPM    P-R Interval 150 ms    QRS Duration 100 ms    Q-T Interval 394 ms    QTC Calculation(Bazett) 556 ms    R Axis 86 degrees    T Axis -33 degrees    Diagnosis Line Atrial flutter with 2 to 1 block with frequent Premature ventricular complexes  Incomplete right bundle branch block  Marked ST abnormality, possible inferior subendocardial injury  Prolonged QT  Abnormal ECG    Confirmed by Brandyn Lima ([id number]) on 7/8/2022 5:05:10 PM    < end of copied text >      I&O's Detail      LABS:                        17.7   9.71  )-----------( 176      ( 08 Jul 2022 14:41 )             50.9     07-08    141  |  103  |  18  ----------------------------<  106<H>  4.2   |  25  |  0.9    Ca    9.3      08 Jul 2022 14:41  Mg     1.9     07-08    TPro  7.5  /  Alb  4.7  /  TBili  0.8  /  DBili  x   /  AST  23  /  ALT  26  /  AlkPhos  77  07-08    CARDIAC MARKERS ( 08 Jul 2022 14:41 )  x     / 0.01 ng/mL / x     / x     / x          PT/INR - ( 08 Jul 2022 12:20 )   PT: 11.50 sec;   INR: 1.00 ratio         PTT - ( 08 Jul 2022 12:20 )  PTT:34.2 sec    BNPSerum Pro-Brain Natriuretic Peptide: 1244 pg/mL (07-08 @ 14:41)    I&O's Detail    Daily Height in cm: 162.56 (08 Jul 2022 13:48)    Daily     RADIOLOGY & ADDITIONAL STUDIES:

## 2022-07-08 NOTE — ED PROVIDER NOTE - PHYSICAL EXAMINATION
CONSTITUTIONAL: Well-appearing; well-nourished; in no apparent distress.   HEAD: Normocephalic; atraumatic.   EYES: PERRL; EOM intact. Conjunctiva normal B/L.   ENT: Normal pharynx with no tonsillar hypertrophy. MMM.  NECK: Supple; non-tender; no cervical lymphadenopathy.   CHEST: Normal chest excursion with respiration.   CARDIOVASCULAR: Normal S1, S2;   RESPIRATORY: Normal chest excursion with respiration; mild wheezing on L  GI/: Normal bowel sounds; non-distended; non-tender.  BACK: No evidence of trauma or deformity. Non-tender to palpation. No CVA tenderness.   EXT: Normal ROM in all four extremities; non-tender to palpation; distal pulses are normal. No leg edema B/L.   SKIN: Normal for age and race; warm; dry; good turgor.  NEURO: A & O x 4; CN 2-12 intact. Grossly unremarkable.

## 2022-07-08 NOTE — H&P ADULT - ATTENDING COMMENTS
70 yo M presented from presurgical testing (for planned posterior neck cyst) for new onset atrial flutter noted on EKG, patient states he has had intermittent SOB at times associated with palpitations for a few months duration.       IMPRESSION:    New Onset Atrial Flutter  Frequent PVCs  HTN  HLD  Posterior Neck Cyst pending removal as outpatient   HO Heavy Smoking, Suspected Underlying COPD    PLAN:    Telemetry monitoring  EPS recommendations noted  Start Eliquis   Continue Metoprolol 50mg BID   Rate control  2D-Echo  LE duplex for bilateral pitting edema  Plan for ANTHONY/DCCV on Monday  Albuterol PRN, outpatient Pulmonary follow up for PFTs  Patient declined need to contact family at this time, girlfriend in [place], Step-Daughter aware of present admission 72 yo M presented from presurgical testing (for planned posterior neck cyst) for new onset atrial flutter noted on EKG, patient states he has had intermittent SOB at times associated with palpitations for a few months duration.       IMPRESSION:    New Onset Atrial Flutter  Frequent PVCs  HTN  HLD  Posterior Neck Cyst pending removal as outpatient   HO Heavy Smoking Quit 1.5 years ago, 50 pack year history, Suspected Underlying COPD    PLAN:    Telemetry monitoring  EPS recommendations noted  Start Eliquis   Continue Metoprolol 50mg BID   Rate control  Check TSH  Check 2D-Echo  Check LE duplex for bilateral pitting edema  Plan for ANTHONY/DCCV on Monday  Albuterol PRN, outpatient Pulmonary follow up for PFTs  Patient declined need to contact family at this time, girlfriend in [place], Step-Daughter aware of present admission

## 2022-07-08 NOTE — ED ADULT NURSE NOTE - OBJECTIVE STATEMENT
PT was sent in from clinic for elevated blood pressure and heart rate. Per EMS pt was in A flutter. Pt asymptomatic. No CP, numbness or tingling.

## 2022-07-08 NOTE — H&P ADULT - NSHPLABSRESULTS_GEN_ALL_CORE
17.3   9.46  )-----------( 163      ( 08 Jul 2022 14:41 )             51.7   07-08    141  |  103  |  18  ----------------------------<  106<H>  4.2   |  25  |  0.9    Ca    9.3      08 Jul 2022 14:41  Mg     1.9     07-08    TPro  7.5  /  Alb  4.7  /  TBili  0.8  /  DBili  x   /  AST  23  /  ALT  26  /  AlkPhos  77  07-08  < from: 12 Lead ECG (07.08.22 @ 15:27) >      Diagnosis Line Atrial flutter with 2 to 1 block with frequent Premature ventricular complexes  Incomplete right bundle branch block  Marked ST abnormality, possible inferior subendocardial injury  Prolonged QT  Abnormal ECG    Confirmed by Rosana

## 2022-07-08 NOTE — H&P ADULT - HISTORY OF PRESENT ILLNESS
71 y M HTN, DLD and afib/flutter noncompliant for medication, HTN, presurgical testing site here at [facility] for neck cyst removal and was incidentally found to have aflutter on ekg at their facility. Patient only take metoprolol and crestor; patient states that he otherwise feels well and does not have sxs. Of note, pt was instructed to see EP but never followed up. Patient is a poor historian. 71 y M HTN, DLD and afib/flutter noncompliant for medication, HTN, presurgical testing site here at [facility] for neck cyst removal and was incidentally found to have aflutter on ekg at their facility. Patient only take metoprolol and crestor; patient states that he otherwise feels well and does not have sxs. Of note, pt was instructed to see EP but never followed up. Patient is a poor historian. Patient mentions having no significant cardiac history  or  heart failure. He is noncompliant with home meds (sometimes takes aspirin and sometimes not)

## 2022-07-08 NOTE — H&P PST ADULT - HISTORY OF PRESENT ILLNESS
72 yo male presents for PAST in preparation for excision of cyst of posterior neck.  Pt complains of a cyst to back of his neck for about a year. Occasional pain associated with coughing and sleeping. Pain is rated as 2/10. Takes nothing for maci  Also reports a new onset of dizziness, bilateral ankle edema, SOB for 5-6 months, symptoms are worse in the morning, states that he feels "like I can't breath when I wake up". ON exam pt's HR is rapid and irregular, ON EKG HR is 118, atrial flutter with frequent PVCs , 911 called, Pt is alert and awake the whole time and able to answer all question appropriately, making phone calls to his friends.       Denies any chest pain, difficulty breathing,  palpitations, dysuria, URI, or any other infections in the last 2 weeks/1 month. Denies any recent travel, contact, or exposure to any persons with known or suspected COVID-19. Pt also denies COVID testing within the last 2 weeks. Pt advised to self quarantine until day of procedure. Exercise tolerance of 2-3 flights of stairs without dyspnea. JESUS reviewed with patient.  Anesthesia Alert  NO--Difficult Airway, CLASS IV  NO--History of neck surgery or radiation  NO--Limited ROM of neck  NO--History of Malignant hyperthermia  NO--Personal or family history of Pseudocholinesterase deficiency.  NO--Prior Anesthesia Complication  NO--Latex Allergy  NO--Loose teeth  NO--History of Rheumatoid Arthritis  NO--JESUS  NO--Bleeding risk  NO--Other_____   written and verbal instructions with teach back on chlorhexidine shampoo provided,  pt verbalized understanding with returned demonstration  Patient verbalized understanding of instructions and was given the opportunity to ask questions and have them answered.

## 2022-07-09 LAB
A1C WITH ESTIMATED AVERAGE GLUCOSE RESULT: 5.6 % — SIGNIFICANT CHANGE UP (ref 4–5.6)
ANION GAP SERPL CALC-SCNC: 14 MMOL/L — SIGNIFICANT CHANGE UP (ref 7–14)
BASOPHILS # BLD AUTO: 0.03 K/UL — SIGNIFICANT CHANGE UP (ref 0–0.2)
BASOPHILS NFR BLD AUTO: 0.4 % — SIGNIFICANT CHANGE UP (ref 0–1)
BUN SERPL-MCNC: 19 MG/DL — SIGNIFICANT CHANGE UP (ref 10–20)
CALCIUM SERPL-MCNC: 8.6 MG/DL — SIGNIFICANT CHANGE UP (ref 8.5–10.1)
CHLORIDE SERPL-SCNC: 102 MMOL/L — SIGNIFICANT CHANGE UP (ref 98–110)
CHOLEST SERPL-MCNC: 149 MG/DL — SIGNIFICANT CHANGE UP
CO2 SERPL-SCNC: 26 MMOL/L — SIGNIFICANT CHANGE UP (ref 17–32)
CREAT SERPL-MCNC: 0.8 MG/DL — SIGNIFICANT CHANGE UP (ref 0.7–1.5)
EGFR: 93 ML/MIN/1.73M2 — SIGNIFICANT CHANGE UP
EOSINOPHIL # BLD AUTO: 0.07 K/UL — SIGNIFICANT CHANGE UP (ref 0–0.7)
EOSINOPHIL NFR BLD AUTO: 0.9 % — SIGNIFICANT CHANGE UP (ref 0–8)
ESTIMATED AVERAGE GLUCOSE: 114 MG/DL — SIGNIFICANT CHANGE UP (ref 68–114)
GLUCOSE SERPL-MCNC: 78 MG/DL — SIGNIFICANT CHANGE UP (ref 70–99)
HCT VFR BLD CALC: 46.6 % — SIGNIFICANT CHANGE UP (ref 42–52)
HCV AB S/CO SERPL IA: 0.04 COI — SIGNIFICANT CHANGE UP
HCV AB SERPL-IMP: SIGNIFICANT CHANGE UP
HDLC SERPL-MCNC: 43 MG/DL — SIGNIFICANT CHANGE UP
HGB BLD-MCNC: 16.3 G/DL — SIGNIFICANT CHANGE UP (ref 14–18)
IMM GRANULOCYTES NFR BLD AUTO: 0.4 % — HIGH (ref 0.1–0.3)
LIPID PNL WITH DIRECT LDL SERPL: 81 MG/DL — SIGNIFICANT CHANGE UP
LYMPHOCYTES # BLD AUTO: 2.09 K/UL — SIGNIFICANT CHANGE UP (ref 1.2–3.4)
LYMPHOCYTES # BLD AUTO: 27.3 % — SIGNIFICANT CHANGE UP (ref 20.5–51.1)
MAGNESIUM SERPL-MCNC: 1.9 MG/DL — SIGNIFICANT CHANGE UP (ref 1.8–2.4)
MCHC RBC-ENTMCNC: 29.8 PG — SIGNIFICANT CHANGE UP (ref 27–31)
MCHC RBC-ENTMCNC: 35 G/DL — SIGNIFICANT CHANGE UP (ref 32–37)
MCV RBC AUTO: 85.2 FL — SIGNIFICANT CHANGE UP (ref 80–94)
MONOCYTES # BLD AUTO: 0.55 K/UL — SIGNIFICANT CHANGE UP (ref 0.1–0.6)
MONOCYTES NFR BLD AUTO: 7.2 % — SIGNIFICANT CHANGE UP (ref 1.7–9.3)
NEUTROPHILS # BLD AUTO: 4.88 K/UL — SIGNIFICANT CHANGE UP (ref 1.4–6.5)
NEUTROPHILS NFR BLD AUTO: 63.8 % — SIGNIFICANT CHANGE UP (ref 42.2–75.2)
NON HDL CHOLESTEROL: 106 MG/DL — SIGNIFICANT CHANGE UP
NRBC # BLD: 0 /100 WBCS — SIGNIFICANT CHANGE UP (ref 0–0)
PHOSPHATE SERPL-MCNC: 2.6 MG/DL — SIGNIFICANT CHANGE UP (ref 2.1–4.9)
PLATELET # BLD AUTO: 164 K/UL — SIGNIFICANT CHANGE UP (ref 130–400)
POTASSIUM SERPL-MCNC: 4.1 MMOL/L — SIGNIFICANT CHANGE UP (ref 3.5–5)
POTASSIUM SERPL-SCNC: 4.1 MMOL/L — SIGNIFICANT CHANGE UP (ref 3.5–5)
RBC # BLD: 5.47 M/UL — SIGNIFICANT CHANGE UP (ref 4.7–6.1)
RBC # FLD: 12.6 % — SIGNIFICANT CHANGE UP (ref 11.5–14.5)
SODIUM SERPL-SCNC: 142 MMOL/L — SIGNIFICANT CHANGE UP (ref 135–146)
TRIGL SERPL-MCNC: 126 MG/DL — SIGNIFICANT CHANGE UP
TSH SERPL-MCNC: 0.85 UIU/ML — SIGNIFICANT CHANGE UP (ref 0.27–4.2)
WBC # BLD: 7.65 K/UL — SIGNIFICANT CHANGE UP (ref 4.8–10.8)
WBC # FLD AUTO: 7.65 K/UL — SIGNIFICANT CHANGE UP (ref 4.8–10.8)

## 2022-07-09 PROCEDURE — 99221 1ST HOSP IP/OBS SF/LOW 40: CPT

## 2022-07-09 PROCEDURE — 99233 SBSQ HOSP IP/OBS HIGH 50: CPT

## 2022-07-09 PROCEDURE — 93306 TTE W/DOPPLER COMPLETE: CPT | Mod: 26

## 2022-07-09 RX ORDER — REGADENOSON 0.08 MG/ML
0.4 INJECTION, SOLUTION INTRAVENOUS ONCE
Refills: 0 | Status: DISCONTINUED | OUTPATIENT
Start: 2022-07-09 | End: 2022-07-13

## 2022-07-09 RX ORDER — LABETALOL HCL 100 MG
10 TABLET ORAL ONCE
Refills: 0 | Status: COMPLETED | OUTPATIENT
Start: 2022-07-09 | End: 2022-07-09

## 2022-07-09 RX ORDER — HYDROCHLOROTHIAZIDE 25 MG
25 TABLET ORAL DAILY
Refills: 0 | Status: DISCONTINUED | OUTPATIENT
Start: 2022-07-09 | End: 2022-07-13

## 2022-07-09 RX ADMIN — ATORVASTATIN CALCIUM 20 MILLIGRAM(S): 80 TABLET, FILM COATED ORAL at 21:31

## 2022-07-09 RX ADMIN — APIXABAN 5 MILLIGRAM(S): 2.5 TABLET, FILM COATED ORAL at 06:41

## 2022-07-09 RX ADMIN — Medication 50 MILLIGRAM(S): at 06:41

## 2022-07-09 RX ADMIN — APIXABAN 5 MILLIGRAM(S): 2.5 TABLET, FILM COATED ORAL at 17:08

## 2022-07-09 RX ADMIN — Medication 50 MILLIGRAM(S): at 17:08

## 2022-07-09 RX ADMIN — LOSARTAN POTASSIUM 100 MILLIGRAM(S): 100 TABLET, FILM COATED ORAL at 06:41

## 2022-07-09 RX ADMIN — Medication 10 MILLIGRAM(S): at 09:14

## 2022-07-09 RX ADMIN — Medication 25 MILLIGRAM(S): at 14:13

## 2022-07-09 NOTE — CONSULT NOTE ADULT - ATTENDING COMMENTS
Patient seen and examined. Pertinent labs, imaging and telemetry reviewed. I agree with the above:    Patient feeling well. Denies complaints of CP, SOB, palpitations.   Patient unsure if he has previously diagnosed Aflutter but had episode 3 years ago where he felt lightheaded and dizzy. Seen in [facility] ED in 2018 without findings of arrhythmia.   -Aflutter on tele and EKG.   -EP consulted and recommend ischemic eval prior to ANTHONY/DCCV.   -Will obtain pharm nuclear stress prior to ANTHONY/DCCV on Monday, 7/11.  -If no ischemia, will go forward with ANTHONY/DCCV.   -Continue with current meds, including eliquis 5mg PO BID.   -Follow up TTE.   -Would obtain additional troponin to rule out prior to stress test.     Discussed with patient and fellow.

## 2022-07-09 NOTE — PROGRESS NOTE ADULT - SUBJECTIVE AND OBJECTIVE BOX
PAMELA NEWBY  71y Male    INTERVAL HPI/OVERNIGHT EVENTS:    pt feels well - denies palpitations, SOB, chest pain, N/V  ROS negative  still tachy at times     T(F): 96.7 (22 @ 04:06), Max: 97.6 (22 @ 01:11)  HR: 105 (22 @ 09:53) (65 - 120)  BP: 164/98 (22 @ 09:53) (164/98 - 194/92)  RR: 18 (22 @ 08:15) (18 - 18)  SpO2: 96% (22 @ 08:15) (96% - 100%) on RA    I&O's Summary    2022 07:01  -  2022 07:00  --------------------------------------------------------  IN: 0 mL / OUT: 400 mL / NET: -400 mL    2022 07:01  -  2022 13:19  --------------------------------------------------------  IN: 215 mL / OUT: 400 mL / NET: -185 mL    Daily Height in cm: 162.56 (2022 13:48)    Daily Weight in k.5 (2022 04:06)      PHYSICAL EXAM:  GENERAL: NAD  HEAD:  Normocephalic  EYES:  conjunctiva and sclera clear  ENMT: Moist mucous membranes  NECK: Supple, posterior neck mass (cyst)  NERVOUS SYSTEM:  Alert, awake, Good concentration  CHEST/LUNG: decreased BS b/l  HEART: tachy, irregular   ABDOMEN: Soft, Nontender, Nondistended; Bowel sounds present  EXTREMITIES: 1+ LE edema  SKIN: warm, dry    Consultant(s) Notes Reviewed:  [x ] YES  [ ] NO  Care Discussed with Consultants/Other Providers [ x] YES  [ ] NO    MEDICATIONS  (STANDING):  apixaban 5 milliGRAM(s) Oral every 12 hours  atorvastatin 20 milliGRAM(s) Oral at bedtime  losartan 100 milliGRAM(s) Oral daily  metoprolol tartrate 50 milliGRAM(s) Oral two times a day    MEDICATIONS  (PRN):      Telemetry reviewed by me    LABS:                        16.3   7.65  )-----------( 164      ( 2022 06:05 )             46.6     07-09    142  |  102  |  19  ----------------------------<  78  4.1   |  26  |  0.8    Ca    8.6      2022 06:05  Phos  2.6     07-09  Mg     1.9     07-09    TPro  7.5  /  Alb  4.7  /  TBili  0.8  /  DBili  x   /  AST  23  /  ALT  26  /  AlkPhos  77  07-08    PT/INR - ( 2022 12:20 )   PT: 11.50 sec;   INR: 1.00 ratio         PTT - ( 2022 12:20 )  PTT:34.2 sec  CARDIAC MARKERS ( 2022 14:41 )  x     / 0.01 ng/mL / x     / x     / x              RADIOLOGY & ADDITIONAL TESTS:    Imaging or report Personally Reviewed:  [x ] YES  [ ] NO    < from: Xray Chest 1 View- PORTABLE-Urgent (22 @ 15:43) >  Impression:    Cardiomegaly with left lung atelectasis.    < end of copied text >      Case discussed with residents and RN on rounds today    Care discussed with pt

## 2022-07-09 NOTE — PROGRESS NOTE ADULT - ASSESSMENT
72 y/o man with PMH of HTN, dyslipidemia on Crestor and Afib/flutter (only takes metoprolol) was sent to the ED from presurgical testing site at [address] [place] [address] for neck cyst removal when he was incidentally found to have Aflutter on the EKG. Pt is asymptomatic.     1. Afib/flutter with rapid ventricular rate  on metoprolol 50mg bid -> can increase to 50mg tid if needed for better rate control  continue telemetry  on apixaban 5mg bid  EP consult appreciated  check ECHO and cardio eval for ischemic workup (PVCs)  TSH 0.85    2. HTN - uncontrolled  on metoprolol 50mg bid, losartan 100mg daily  add HCTZ 25mg daily and monitor     3. Dyslipidemia on statin    4. COPD, h/o tobacco use  on Breo at home    5. DVT prophylaxis - apixaban      PROGRESS NOTE HANDOFF    Pending: ECHO, better rate control, cardio eval for ischemic workup    pt informed of the plan of care    Disposition: home

## 2022-07-09 NOTE — CONSULT NOTE ADULT - ASSESSMENT
71 y M HTN, DLD and afib/flutter noncompliant for medication, presented to presurgical testing site here at [facility] for neck cyst removal and was incidentally found to have aflutter on ekg at their facility.  In the ED patient EKG showed aflutter with incomplete RBBB and PVC. Cardiology consulted for ischemic work up.     Impression   -Aflutter  CHADS VASc at least 2 (HTN, Age > 65)  -HTN/ HLD  -Exsmoker  -------------------------------------  TSH 0.85  LDL 81  EKG aflutter with 2:1 block with incomplete RBBB      Recommendations   -telemetry monitor   -2d ECHO   -cont with eliquis 5 mg BID  -cont with metoprolol 50 BID  -cont with losartan and HCTZ  -cont with lipitor 20 mg dialy  -can get a NM stress test to r/o ischemia before ANTHONY/ CV   -If no evidence of ischemia on stress test can proceed with the ANTHONY/ CV

## 2022-07-09 NOTE — CONSULT NOTE ADULT - SUBJECTIVE AND OBJECTIVE BOX
HPI:  71 y M HTN, DLD and afib/flutter noncompliant for medication, presented to presurgical testing site here at [facility] for neck cyst removal and was incidentally found to have aflutter on ekg at their facility. Patient only take metoprolol and crestor; patient states that he otherwise feels well and does not have sxs. Of note, pt was instructed to see EP but never followed up. Patient is a poor historian. Patient mentions having no significant cardiac history  or  heart failure. He is noncompliant with home meds (sometimes takes aspirin and sometimes not)  (08 Jul 2022 20:07)    Cardiology addedum   patient was admitted for aflutter. patient is currently asymptomatic. he denies chest pain, SOB and palpitations. EPS were consulted and recommended ANTHONY and CV.   Cardiology consulted for ischemia work up.       PAST MEDICAL & SURGICAL HISTORY  HTN (hypertension)    High cholesterol    Skin cyst    Obese    SOB (shortness of breath)    Edema of both ankles    H/O right knee surgery        FAMILY HISTORY:  FAMILY HISTORY:      SOCIAL HISTORY:  [x]ex- smoker  []Alcohol  []Drug    ALLERGIES:  penicillin (Unknown)      MEDICATIONS:  MEDICATIONS  (STANDING):  apixaban 5 milliGRAM(s) Oral every 12 hours  atorvastatin 20 milliGRAM(s) Oral at bedtime  hydrochlorothiazide 25 milliGRAM(s) Oral daily  losartan 100 milliGRAM(s) Oral daily  metoprolol tartrate 50 milliGRAM(s) Oral two times a day  regadenoson Injectable 0.4 milliGRAM(s) IV Push once    MEDICATIONS  (PRN):      HOME MEDICATIONS:  Home Medications:  Breo Ellipta 100 mcg-25 mcg/inh inhalation powder: 1 puff(s) inhaled once a day (08 Jul 2022 20:31)  Crestor 5 mg oral tablet: 1 tab(s) orally once a day (08 Jul 2022 20:31)  losartan 100 mg oral tablet: 1 tab(s) orally once a day (08 Jul 2022 20:31)  Metoprolol Succinate ER 50 mg oral tablet, extended release: 1 tab(s) orally once a day (08 Jul 2022 20:31)      VITALS:   T(F): 97.9 (07-09 @ 14:02), Max: 97.9 (07-09 @ 14:02)  HR: 60 (07-09 @ 14:02) (60 - 120)  BP: 140/76 (07-09 @ 14:02) (140/76 - 210/110)  BP(mean): 143 (07-08 @ 12:20) (143 - 143)  RR: 18 (07-09 @ 14:02) (18 - 18)  SpO2: 96% (07-09 @ 08:15) (96% - 100%)    I&O's Summary    08 Jul 2022 07:01  -  09 Jul 2022 07:00  --------------------------------------------------------  IN: 0 mL / OUT: 400 mL / NET: -400 mL    09 Jul 2022 07:01  -  09 Jul 2022 16:50  --------------------------------------------------------  IN: 440 mL / OUT: 400 mL / NET: 40 mL        REVIEW OF SYSTEMS:  CONSTITUTIONAL: No weakness, fevers or chills  EYES: No visual changes  ENT: No vertigo or throat pain   NECK: No pain or stiffness  RESPIRATORY: No cough, wheezing, hemoptysis; No shortness of breath  CARDIOVASCULAR: No chest pain or palpitations  GASTROINTESTINAL: No abdominal or epigastric pain. No nausea, vomiting, or hematemesis; No diarrhea or constipation. No melena or hematochezia.  GENITOURINARY: No dysuria, frequency or hematuria  NEUROLOGICAL: No numbness or weakness  SKIN: No itching, no rashes  MSK: No pain    PHYSICAL EXAM:  NEURO: patient is awake , alert and oriented  GEN: Not in acute distress  NECK: no thyroid enlargement, no JVD  LUNGS: Clear to auscultation bilaterally   CARDIOVASCULAR: S1/S2 present, RRR , no murmurs or rubs, no carotid bruits,  + PP bilaterally  ABD: Soft, non-tender, non-distended, +BS  EXT: No DUSTIN  SKIN: Intact    LABS:                        16.3   7.65  )-----------( 164      ( 09 Jul 2022 06:05 )             46.6     07-09    142  |  102  |  19  ----------------------------<  78  4.1   |  26  |  0.8    Ca    8.6      09 Jul 2022 06:05  Phos  2.6     07-09  Mg     1.9     07-09    TPro  7.5  /  Alb  4.7  /  TBili  0.8  /  DBili  x   /  AST  23  /  ALT  26  /  AlkPhos  77  07-08    PT/INR - ( 08 Jul 2022 12:20 )   PT: 11.50 sec;   INR: 1.00 ratio         PTT - ( 08 Jul 2022 12:20 )  PTT:34.2 sec    CARDIAC MARKERS ( 08 Jul 2022 14:41 )  x     / 0.01 ng/mL / x     / x     / x            Troponin trend:    Serum Pro-Brain Natriuretic Peptide: 1244 pg/mL (07-08-22 @ 14:41)    07-09 Chol 149 LDL -- HDL 43 Trig 126      RADIOLOGY:  -CXR:  < from: Xray Chest 1 View- PORTABLE-Urgent (07.08.22 @ 15:43) >  Impression:    Cardiomegaly with left lung atelectasis.        --- End of Report ---      < end of copied text >            ECG:    < from: 12 Lead ECG (07.08.22 @ 15:27) >  Diagnosis Line Atrial flutter with 2 to 1 block with frequent Premature ventricular complexes  Incomplete right bundle branch block  Marked ST abnormality, possible inferior subendocardial injury  Prolonged QT  Abnormal ECG    < end of copied text >

## 2022-07-09 NOTE — PATIENT PROFILE ADULT - FALL HARM RISK - HARM RISK INTERVENTIONS

## 2022-07-10 LAB
ALBUMIN SERPL ELPH-MCNC: 3.8 G/DL — SIGNIFICANT CHANGE UP (ref 3.5–5.2)
ALP SERPL-CCNC: 60 U/L — SIGNIFICANT CHANGE UP (ref 30–115)
ALT FLD-CCNC: 17 U/L — SIGNIFICANT CHANGE UP (ref 0–41)
ANION GAP SERPL CALC-SCNC: 11 MMOL/L — SIGNIFICANT CHANGE UP (ref 7–14)
AST SERPL-CCNC: 14 U/L — SIGNIFICANT CHANGE UP (ref 0–41)
BASOPHILS # BLD AUTO: 0.02 K/UL — SIGNIFICANT CHANGE UP (ref 0–0.2)
BASOPHILS NFR BLD AUTO: 0.3 % — SIGNIFICANT CHANGE UP (ref 0–1)
BILIRUB SERPL-MCNC: 0.8 MG/DL — SIGNIFICANT CHANGE UP (ref 0.2–1.2)
BUN SERPL-MCNC: 16 MG/DL — SIGNIFICANT CHANGE UP (ref 10–20)
CALCIUM SERPL-MCNC: 8.6 MG/DL — SIGNIFICANT CHANGE UP (ref 8.5–10.1)
CHLORIDE SERPL-SCNC: 100 MMOL/L — SIGNIFICANT CHANGE UP (ref 98–110)
CO2 SERPL-SCNC: 28 MMOL/L — SIGNIFICANT CHANGE UP (ref 17–32)
CREAT SERPL-MCNC: 0.8 MG/DL — SIGNIFICANT CHANGE UP (ref 0.7–1.5)
EGFR: 93 ML/MIN/1.73M2 — SIGNIFICANT CHANGE UP
EOSINOPHIL # BLD AUTO: 0.09 K/UL — SIGNIFICANT CHANGE UP (ref 0–0.7)
EOSINOPHIL NFR BLD AUTO: 1.2 % — SIGNIFICANT CHANGE UP (ref 0–8)
GLUCOSE SERPL-MCNC: 93 MG/DL — SIGNIFICANT CHANGE UP (ref 70–99)
HCT VFR BLD CALC: 45.9 % — SIGNIFICANT CHANGE UP (ref 42–52)
HGB BLD-MCNC: 16.4 G/DL — SIGNIFICANT CHANGE UP (ref 14–18)
IMM GRANULOCYTES NFR BLD AUTO: 0.4 % — HIGH (ref 0.1–0.3)
LYMPHOCYTES # BLD AUTO: 2.12 K/UL — SIGNIFICANT CHANGE UP (ref 1.2–3.4)
LYMPHOCYTES # BLD AUTO: 28 % — SIGNIFICANT CHANGE UP (ref 20.5–51.1)
MAGNESIUM SERPL-MCNC: 1.7 MG/DL — LOW (ref 1.8–2.4)
MCHC RBC-ENTMCNC: 30 PG — SIGNIFICANT CHANGE UP (ref 27–31)
MCHC RBC-ENTMCNC: 35.7 G/DL — SIGNIFICANT CHANGE UP (ref 32–37)
MCV RBC AUTO: 83.9 FL — SIGNIFICANT CHANGE UP (ref 80–94)
MONOCYTES # BLD AUTO: 0.58 K/UL — SIGNIFICANT CHANGE UP (ref 0.1–0.6)
MONOCYTES NFR BLD AUTO: 7.7 % — SIGNIFICANT CHANGE UP (ref 1.7–9.3)
NEUTROPHILS # BLD AUTO: 4.73 K/UL — SIGNIFICANT CHANGE UP (ref 1.4–6.5)
NEUTROPHILS NFR BLD AUTO: 62.4 % — SIGNIFICANT CHANGE UP (ref 42.2–75.2)
NRBC # BLD: 0 /100 WBCS — SIGNIFICANT CHANGE UP (ref 0–0)
PLATELET # BLD AUTO: 156 K/UL — SIGNIFICANT CHANGE UP (ref 130–400)
POTASSIUM SERPL-MCNC: 3.7 MMOL/L — SIGNIFICANT CHANGE UP (ref 3.5–5)
POTASSIUM SERPL-SCNC: 3.7 MMOL/L — SIGNIFICANT CHANGE UP (ref 3.5–5)
PROT SERPL-MCNC: 6.2 G/DL — SIGNIFICANT CHANGE UP (ref 6–8)
RBC # BLD: 5.47 M/UL — SIGNIFICANT CHANGE UP (ref 4.7–6.1)
RBC # FLD: 12.7 % — SIGNIFICANT CHANGE UP (ref 11.5–14.5)
SARS-COV-2 RNA SPEC QL NAA+PROBE: SIGNIFICANT CHANGE UP
SODIUM SERPL-SCNC: 139 MMOL/L — SIGNIFICANT CHANGE UP (ref 135–146)
TROPONIN T SERPL-MCNC: <0.01 NG/ML — SIGNIFICANT CHANGE UP
WBC # BLD: 7.57 K/UL — SIGNIFICANT CHANGE UP (ref 4.8–10.8)
WBC # FLD AUTO: 7.57 K/UL — SIGNIFICANT CHANGE UP (ref 4.8–10.8)

## 2022-07-10 PROCEDURE — 99233 SBSQ HOSP IP/OBS HIGH 50: CPT

## 2022-07-10 PROCEDURE — 93970 EXTREMITY STUDY: CPT | Mod: 26

## 2022-07-10 RX ADMIN — LOSARTAN POTASSIUM 100 MILLIGRAM(S): 100 TABLET, FILM COATED ORAL at 05:34

## 2022-07-10 RX ADMIN — ATORVASTATIN CALCIUM 20 MILLIGRAM(S): 80 TABLET, FILM COATED ORAL at 21:46

## 2022-07-10 RX ADMIN — Medication 25 MILLIGRAM(S): at 05:34

## 2022-07-10 RX ADMIN — Medication 50 MILLIGRAM(S): at 05:34

## 2022-07-10 RX ADMIN — APIXABAN 5 MILLIGRAM(S): 2.5 TABLET, FILM COATED ORAL at 05:34

## 2022-07-10 RX ADMIN — Medication 50 MILLIGRAM(S): at 17:34

## 2022-07-10 NOTE — PROGRESS NOTE ADULT - SUBJECTIVE AND OBJECTIVE BOX
PAMELA NEWBY  71y Male    INTERVAL HPI/OVERNIGHT EVENTS:    pt feels OK except for occasional SOB and cough which he attributes to h/o tobacco use  no other complaints  ROS negative  he adamantly refused nuclear stress test because he had a severe reaction in the past when he had one: head exploding and legs did not feel right.  I spoke to his nephew (a radiologist) at his request - pt unable to have CCTA because of Afib with rapid rate.  Case discussed with cardiology today      T(F): 97.2 (07-10-22 @ 12:00), Max: 97.5 (07-10-22 @ 05:23)  HR: 116 (07-10-22 @ 12:00) (78 - 116)  BP: 144/91 (07-10-22 @ 12:00) (138/92 - 169/109)  RR: 18 (07-10-22 @ 12:00) (18 - 18)  SpO2: 95% (07-10-22 @ 08:42) (95% - 95%) on RA    I&O's Summary    2022 07:01  -  10 Jul 2022 07:00  --------------------------------------------------------  IN: 885 mL / OUT: 1100 mL / NET: -215 mL    10 Jul 2022 07:01  -  10 Jul 2022 14:58  --------------------------------------------------------  IN: 457 mL / OUT: 400 mL / NET: 57 mL      Daily Weight in k.2 (10 Jul 2022 05:23)    PHYSICAL EXAM:  GENERAL: NAD  HEAD:  Normocephalic  EYES:  conjunctiva and sclera clear  ENMT: Moist mucous membranes  NECK: Supple  NERVOUS SYSTEM:  Alert, awake, Good concentration  CHEST/LUNG: decreased BS b/l  HEART: IRR, tachy at times  ABDOMEN: Soft, Nontender, Nondistended; Bowel sounds present  EXTREMITIES: improved LE edema  SKIN: warm, dry    Consultant(s) Notes Reviewed:  [x ] YES  [ ] NO  Care Discussed with Consultants/Other Providers [ x] YES  [ ] NO    MEDICATIONS  (STANDING):  apixaban 5 milliGRAM(s) Oral every 12 hours  atorvastatin 20 milliGRAM(s) Oral at bedtime  hydrochlorothiazide 25 milliGRAM(s) Oral daily  losartan 100 milliGRAM(s) Oral daily  metoprolol tartrate 50 milliGRAM(s) Oral two times a day  regadenoson Injectable 0.4 milliGRAM(s) IV Push once    MEDICATIONS  (PRN):      Telemetry reviewed by me    LABS:                        16.4   7.57  )-----------( 156      ( 10 Jul 2022 04:30 )             45.9     07-10    139  |  100  |  16  ----------------------------<  93  3.7   |  28  |  0.8    Ca    8.6      10 Jul 2022 04:30  Phos  2.6     07-09  Mg     1.7     07-10    TPro  6.2  /  Alb  3.8  /  TBili  0.8  /  DBili  x   /  AST  14  /  ALT  17  /  AlkPhos  60  07-10      CARDIAC MARKERS ( 10 Jul 2022 12:20 )  x     / <0.01 ng/mL / x     / x     / x          < from: TTE Echo Complete w/o Contrast w/ Doppler (22 @ 15:13) >  Summary:   1. LV Ejection Fraction by Oh's Method with a biplane EF of 44 %.   2. Severe asymmetric left ventricular hypertrophy.   3. The mitral in-flow pattern reveals no discernable A-wave, therefore   no comment on diastolic function can be made.   4. Mild to moderately enlarged left atrium.   5. Mildly enlarged right atrium.   6. Mild mitral valve regurgitation.   7. Mild tricuspid regurgitation.   8. Estimated pulmonary artery systolic pressure is 39.8 mmHg assuming a   right atrial pressure of 3 mmHg, which is consistent with borderline   pulmonary hypertension.    < end of copied text >        Case discussed with RN today    Care discussed with pt and family

## 2022-07-10 NOTE — PROGRESS NOTE ADULT - SUBJECTIVE AND OBJECTIVE BOX
SUBJECTIVE:  HPI:  71 y M HTN, DLD and afib/flutter noncompliant for medication, HTN, presurgical testing site here at [facility] for neck cyst removal and was incidentally found to have aflutter on ekg at their facility. Patient only take metoprolol and crestor; patient states that he otherwise feels well and does not have sxs. Of note, pt was instructed to see EP but never followed up. Patient is a poor historian. Patient mentions having no significant cardiac history  or  heart failure. He is noncompliant with home meds (sometimes takes aspirin and sometimes not)  (08 Jul 2022 20:07)      Patient is a 71y old Male who presents with a chief complaint of Chest discomfort (09 Jul 2022 16:49)    Currently admitted to medicine with the primary diagnosis of New onset atrial flutter    Today is hospital day 2d.     PAST MEDICAL & SURGICAL HISTORY  HTN (hypertension)    High cholesterol    Skin cyst    Obese    SOB (shortness of breath)    Edema of both ankles    H/O right knee surgery        ALLERGIES:  penicillin (Unknown)    MEDICATIONS:  ACTIVE MEDICATIONS  apixaban 5 milliGRAM(s) Oral every 12 hours  atorvastatin 20 milliGRAM(s) Oral at bedtime  hydrochlorothiazide 25 milliGRAM(s) Oral daily  losartan 100 milliGRAM(s) Oral daily  metoprolol tartrate 50 milliGRAM(s) Oral two times a day  regadenoson Injectable 0.4 milliGRAM(s) IV Push once      VITALS:   T(F): 96.4  HR: 78  BP: 156/65  RR: 18  SpO2: 96%    LABS:                        16.3   7.65  )-----------( 164      ( 09 Jul 2022 06:05 )             46.6     07-09    142  |  102  |  19  ----------------------------<  78  4.1   |  26  |  0.8    Ca    8.6      09 Jul 2022 06:05  Phos  2.6     07-09  Mg     1.9     07-09    TPro  7.5  /  Alb  4.7  /  TBili  0.8  /  DBili  x   /  AST  23  /  ALT  26  /  AlkPhos  77  07-08    PT/INR - ( 08 Jul 2022 12:20 )   PT: 11.50 sec;   INR: 1.00 ratio         PTT - ( 08 Jul 2022 12:20 )  PTT:34.2 sec          CARDIAC MARKERS ( 08 Jul 2022 14:41 )  x     / 0.01 ng/mL / x     / x     / x              PHYSICAL EXAM:  GEN: No acute distress  LUNGS: Clear to auscultation bilaterally   HEART: S1/S2 present.    ABD: Soft, non-tender, non-distended.   EXT: No pedal edema, warm to touch, no discoloration  NEURO: AAOX3

## 2022-07-10 NOTE — PROGRESS NOTE ADULT - SUBJECTIVE AND OBJECTIVE BOX
INTERVAL HPI/OVERNIGHT EVENTS:  remains in [facility]  plan for stress test today (?pt refused) secondary to decrease EF  scheduled for ANTHONY/DCCV tomorrow, however still need ischemic work up    MEDICATIONS  (STANDING):  apixaban 5 milliGRAM(s) Oral every 12 hours  atorvastatin 20 milliGRAM(s) Oral at bedtime  hydrochlorothiazide 25 milliGRAM(s) Oral daily  losartan 100 milliGRAM(s) Oral daily  metoprolol tartrate 50 milliGRAM(s) Oral two times a day  regadenoson Injectable 0.4 milliGRAM(s) IV Push once    MEDICATIONS  (PRN):      Allergies    penicillin (Unknown)    Intolerances        REVIEW OF SYSTEMS    [x ] A ten-point review of systems was otherwise negative except as noted.  [ ] Due to altered mental status/intubation, subjective information were not able to be obtained from the patient. History was obtained, to the extent possible, from review of the chart and collateral sources of information.      Vital Signs Last 24 Hrs  T(C): 36.2 (10 Jul 2022 12:00), Max: 36.4 (10 Jul 2022 05:23)  T(F): 97.2 (10 Jul 2022 12:00), Max: 97.5 (10 Jul 2022 05:23)  HR: 116 (10 Jul 2022 12:00) (78 - 116)  BP: 144/91 (10 Jul 2022 12:00) (138/92 - 169/109)  BP(mean): --  RR: 18 (10 Jul 2022 12:00) (18 - 18)  SpO2: 95% (10 Jul 2022 08:42) (95% - 95%)    Parameters below as of 10 Jul 2022 08:42  Patient On (Oxygen Delivery Method): room air        07-09-22 @ 07:01  -  07-10-22 @ 07:00  --------------------------------------------------------  IN: 885 mL / OUT: 1100 mL / NET: -215 mL    07-10-22 @ 07:01  -  07-10-22 @ 15:28  --------------------------------------------------------  IN: 457 mL / OUT: 400 mL / NET: 57 mL        PHYSICAL EXAM:    GENERAL: In no apparent distress, well nourished, and hydrated.  HEART: IRRegular rate and rhythm; No murmur; NO rubs, or gallops.  PULMONARY: Clear to auscultation and percussion.  Normal expansion/effort. No rales, wheezing, or rhonchi bilaterally.  ABDOMEN: Soft, Nontender, Nondistended; Bowel sounds present  EXTREMITIES:  Extremities warm, pink, well-perfused, 2+ Peripheral Pulses, No clubbing, cyanosis, or edema  NEUROLOGICAL: alert & oriented x 3, no focal deficits, ANTHONY KNIGHT    LABS:                        16.4   7.57  )-----------( 156      ( 10 Jul 2022 04:30 )             45.9     07-10    139  |  100  |  16  ----------------------------<  93  3.7   |  28  |  0.8    Ca    8.6      10 Jul 2022 04:30  Phos  2.6     07-09  Mg     1.7     07-10    TPro  6.2  /  Alb  3.8  /  TBili  0.8  /  DBili  x   /  AST  14  /  ALT  17  /  AlkPhos  60  07-10    Thyroid Stimulating Hormone, Serum: 0.85 uIU/mL (07.09.22 @ 06:05)        COVID-19 PCR: NotDetec (07-09-22 @ 18:24)  COVID-19 PCR: NotDetec (07-08-22 @ 14:31)          RADIOLOGY & ADDITIONAL TESTS:    < from: TTE Echo Complete w/o Contrast w/ Doppler (07.09.22 @ 15:13) >  Summary:   1. LV Ejection Fraction by Oh's Method with a biplane EF of 44 %.   2. Severe asymmetric left ventricular hypertrophy.   3. The mitral in-flow pattern reveals no discernable A-wave, therefore   no comment on diastolic function can be made.   4. Mild to moderately enlarged left atrium.   5. Mildly enlarged right atrium.   6. Mild mitral valve regurgitation.   7. Mild tricuspid regurgitation.   8. Estimated pulmonary artery systolic pressure is 39.8 mmHg assuming a   right atrial pressure of 3 mmHg, which is consistent with borderline   pulmonary hypertension.    < end of copied text >

## 2022-07-10 NOTE — PROGRESS NOTE ADULT - ASSESSMENT
70 y/o man with PMH of HTN, dyslipidemia on Crestor and Afib/flutter (only takes metoprolol) was sent to the ED from presurgical testing site at [address] [place] [address] for neck cyst removal when he was incidentally found to have Aflutter on the EKG. Pt is asymptomatic.     1. Afib/flutter with rapid ventricular rate  on metoprolol 50mg bid -> can increase to 50mg tid if needed for better rate control  continue telemetry  on apixaban 5mg bid  EP and cardio consults appreciated  pt with PVCs  ECHO: EF 44%, severe asymmetric LVH, borderline Pulm HTN  Lower EF could be due to tachycardia and/or underlying ischemia  pt currently refusing nuclear stress test to rule out ischemia  CCTA not recommended due to Afib with RVR  troponins negative x 2  cardio f/u: ? consider cardiac cath if high suspicion for CAD and pt is agreeable  TSH 0.85  EP f/u tomorrow: pt is agreeable for ANTHONY/cardioversion     2. HTN - uncontrolled  on metoprolol 50mg bid, losartan 100mg daily  added HCTZ 25mg daily and monitor     3. Dyslipidemia on statin    4. COPD, h/o tobacco use  on Breo at home    5. DVT prophylaxis - apixaban      PROGRESS NOTE HANDOFF    Pending: better rate control, EP f/u re: ANTHONY/cardioversion    pt informed of the plan of care    Disposition: home 72 y/o man with PMH of HTN, dyslipidemia on Crestor and Afib/flutter (only takes metoprolol) was sent to the ED from presurgical testing site at [address] [place] [address] for neck cyst removal when he was incidentally found to have Aflutter on the EKG. Pt is asymptomatic.     1. Afib/flutter with rapid ventricular rate  on metoprolol 50mg bid -> can increase to 50mg tid if needed for better rate control  continue telemetry  on apixaban 5mg bid  EP and cardio consults appreciated  pt with PVCs  ECHO: EF 44%, severe asymmetric LVH, borderline Pulm HTN  Lower EF could be due to tachycardia and/or underlying ischemia  pt currently refusing nuclear stress test to rule out ischemia  CCTA not recommended due to Afib with RVR  troponins negative x 2  cardio f/u: ? consider cardiac cath if high suspicion for CAD and pt is agreeable  TSH 0.85  EP f/u tomorrow: pt is agreeable for ANTHONY/cardioversion     2. HTN - uncontrolled  on metoprolol 50mg bid, losartan 100mg daily  added HCTZ 25mg daily and monitor     3. Dyslipidemia on statin    4. COPD, h/o tobacco use  on Breo at home    5. DVT prophylaxis - apixaban      PROGRESS NOTE HANDOFF    Pending: better rate control, EP f/u re: ANTOHNY/cardioversion    pt informed of the plan of care    Disposition: home    Addendum: case discussed with cardio attending and EP PA today  keep NPO after midnight for possible cardiac cath on 7/11   hold apixaban  RN and pt informed of the plan of care

## 2022-07-10 NOTE — PROGRESS NOTE ADULT - ASSESSMENT
70 yo M with hx of HTN and HLD sent to ED from [facility] for new onset atrial flutter. Patient asymptomatic  decrease LF function EF 44%    New Onset AFL  CHADSVASC = 1  PVCs  HTN  HLD    con't tele  Recommend ischemic workup given frequent PVCs and EF 44%  patient refused stress test today  however in light of newly decrease EF, we strongly recommend ischemic eval prior to DCCV  CCTA is not an option for the patient due to high ventricular rates  con't Eliquis 5mg Q12h for stroke prevention, unless invasive procedure (LHC) planned   con't  Metoprolol 50mg BID for rate control  Maintain electrolytes K>4.0 Mg >2.0   Will follow

## 2022-07-10 NOTE — PROGRESS NOTE ADULT - ASSESSMENT
70 y/o man with PMH of HTN, dyslipidemia on Crestor and Afib/flutter (only takes metoprolol) was sent to the ED from presurgical testing site at [address] [place] [address] for neck cyst removal when he was incidentally found to have Aflutter on the EKG. Pt is asymptomatic.     1. Afib/flutter with rapid ventricular rate  TSH 0.85  on metoprolol 50mg bid -> can increase to 50mg tid if needed for better rate control  continue telemetry  on apixaban 5mg bid  EP consult appreciated  -Troponin ordered for Monday morning  -Will obtain pharm nuclear stress prior to ANTHONY/DCCV on Monday, 7/11.  -If no ischemia, will go forward with ANTHONY/DCCV.       2. HTN - uncontrolled  on metoprolol 50mg bid, losartan 100mg daily  add HCTZ 25mg daily and monitor     3. Dyslipidemia on statin    4. COPD, h/o tobacco use  on Breo at home    5. DVT prophylaxis - apixaban      PROGRESS NOTE HANDOFF    Pending: ECHO, better rate control, cardio eval for ischemic workup    pt informed of the plan of care    Disposition: home

## 2022-07-10 NOTE — PROGRESS NOTE ADULT - NS ATTEND AMEND GEN_ALL_CORE FT
pt seen 7/10    Rec ischemic work up first  If neg, rec ANTHONY/CV  Increase BB for better rate control

## 2022-07-11 DIAGNOSIS — Z02.9 ENCOUNTER FOR ADMINISTRATIVE EXAMINATIONS, UNSPECIFIED: ICD-10-CM

## 2022-07-11 PROCEDURE — 99233 SBSQ HOSP IP/OBS HIGH 50: CPT

## 2022-07-11 PROCEDURE — 99232 SBSQ HOSP IP/OBS MODERATE 35: CPT

## 2022-07-11 RX ORDER — NIFEDIPINE 30 MG
60 TABLET, EXTENDED RELEASE 24 HR ORAL DAILY
Refills: 0 | Status: DISCONTINUED | OUTPATIENT
Start: 2022-07-11 | End: 2022-07-13

## 2022-07-11 RX ORDER — APIXABAN 2.5 MG/1
5 TABLET, FILM COATED ORAL EVERY 12 HOURS
Refills: 0 | Status: DISCONTINUED | OUTPATIENT
Start: 2022-07-11 | End: 2022-07-13

## 2022-07-11 RX ORDER — METOPROLOL TARTRATE 50 MG
50 TABLET ORAL THREE TIMES A DAY
Refills: 0 | Status: DISCONTINUED | OUTPATIENT
Start: 2022-07-11 | End: 2022-07-12

## 2022-07-11 RX ORDER — APIXABAN 2.5 MG/1
1 TABLET, FILM COATED ORAL
Qty: 60 | Refills: 0
Start: 2022-07-11 | End: 2022-08-09

## 2022-07-11 RX ORDER — APIXABAN 2.5 MG/1
1 TABLET, FILM COATED ORAL
Qty: 0 | Refills: 0 | DISCHARGE
Start: 2022-07-11

## 2022-07-11 RX ADMIN — Medication 60 MILLIGRAM(S): at 17:51

## 2022-07-11 RX ADMIN — Medication 50 MILLIGRAM(S): at 14:15

## 2022-07-11 RX ADMIN — LOSARTAN POTASSIUM 100 MILLIGRAM(S): 100 TABLET, FILM COATED ORAL at 04:34

## 2022-07-11 RX ADMIN — Medication 50 MILLIGRAM(S): at 21:30

## 2022-07-11 RX ADMIN — APIXABAN 5 MILLIGRAM(S): 2.5 TABLET, FILM COATED ORAL at 17:51

## 2022-07-11 RX ADMIN — Medication 50 MILLIGRAM(S): at 04:35

## 2022-07-11 RX ADMIN — ATORVASTATIN CALCIUM 20 MILLIGRAM(S): 80 TABLET, FILM COATED ORAL at 21:30

## 2022-07-11 RX ADMIN — Medication 25 MILLIGRAM(S): at 04:34

## 2022-07-11 NOTE — PROGRESS NOTE ADULT - ASSESSMENT
70 y/o man with PMH of HTN, dyslipidemia on Crestor and Afib/flutter (only takes metoprolol) was sent to the ED from presurgical testing site at [address] [place] [address] for neck cyst removal when he was incidentally found to have Aflutter on the EKG. Pt is asymptomatic.     #Afib/flutter with rapid ventricular rate  - on metoprolol 50mg tid and increase as BP tolerates for better rate control (<110)  - continue telemetry  - continue apixaban 5mg bid   - EP and cardio consults appreciated  - pt with PVCs  - ECHO: EF 44%, severe asymmetric LVH, borderline Pulm HTN  - Lower EF could be due to tachycardia and/or underlying ischemia  - CCTA not recommended due to Afib with RVR  - troponins negative x 2  - plan for possible ANTHONY/cardioversion tomorrow if BP controlled - pt NPO after midnight for procedure  - EP following for possible ANTHONY/cardioversion tomorrow  - TSH 0.85    #HTN - uncontrolled  - on metoprolol 50mg tid, losartan 100mg daily, HCTZ 25mg daily   - started nifedipine xl 60mg for uncontrolled BP >180    #Dyslipidemia on statin    #COPD, h/o tobacco use  - on Breo at home    #DVT prophylaxis - continue with apixaban

## 2022-07-11 NOTE — PROGRESS NOTE ADULT - ASSESSMENT
70 y/o man with PMH of HTN, dyslipidemia on Crestor and Afib/flutter (only takes metoprolol) was sent to the ED from presurgical testing site at [address] [place] [address] for neck cyst removal when he was incidentally found to have Aflutter on the EKG. Pt is asymptomatic.     1. Afib/flutter with rapid ventricular rate  on metoprolol 50mg tid and increase as BP tolerates for better rate control (<110)  continue telemetry  apixaban 5mg bid held for cardiac cath -> can use lovenox therapeutic dose if cath is delayed  EP and cardio consults appreciated  pt with PVCs  ECHO: EF 44%, severe asymmetric LVH, borderline Pulm HTN  Lower EF could be due to tachycardia and/or underlying ischemia  CCTA not recommended due to Afib with RVR  troponins negative x 2  plan for possible cardiac cath today - pt NPO for procedure  TSH 0.85  EP following for possible ANTHONY/cardioversion after ischemic workup    2. HTN - uncontrolled  on metoprolol 50mg tid, losartan 100mg daily, HCTZ 25mg daily   add hydralazine 25mg tid if remains uncontrolled today    3. Dyslipidemia on statin    4. COPD, h/o tobacco use  on Breo at home    5. DVT prophylaxis - apixaban on hold for cath  lovenox       PROGRESS NOTE HANDOFF    Pending: better rate and BP control, possible cardiac cath today    pt informed of the plan of care    Disposition: home

## 2022-07-11 NOTE — PROGRESS NOTE ADULT - SUBJECTIVE AND OBJECTIVE BOX
PAMELA NEWBY 71y Male  MRN#: [id number]   CODE STATUS: FULL      SUBJECTIVE  Patient is a 71y old Male who presents with a chief complaint of Chest discomfort (11 Jul 2022 16:05)    Currently admitted to medicine with the primary diagnosis of A Flutter.    Today is hospital day 3d, and this morning he is laying in bed comfortably and reports no overnight events.   Denies chest pain, shortness of breath.    OBJECTIVE  PAST MEDICAL & SURGICAL HISTORY  HTN (hypertension)    High cholesterol    Skin cyst    Obese    SOB (shortness of breath)    Edema of both ankles    H/O right knee surgery      ALLERGIES:  penicillin (Unknown)    HOME MEDICATIONS:  Home Medications:  Breo Ellipta 100 mcg-25 mcg/inh inhalation powder: 1 puff(s) inhaled once a day (08 Jul 2022 20:31)  Crestor 5 mg oral tablet: 1 tab(s) orally once a day (08 Jul 2022 20:31)  losartan 100 mg oral tablet: 1 tab(s) orally once a day (08 Jul 2022 20:31)  Metoprolol Succinate ER 50 mg oral tablet, extended release: 1 tab(s) orally once a day (08 Jul 2022 20:31)    MEDICATIONS:  STANDING MEDICATIONS  apixaban 5 milliGRAM(s) Oral every 12 hours  atorvastatin 20 milliGRAM(s) Oral at bedtime  hydrochlorothiazide 25 milliGRAM(s) Oral daily  losartan 100 milliGRAM(s) Oral daily  metoprolol tartrate 50 milliGRAM(s) Oral three times a day  NIFEdipine XL 60 milliGRAM(s) Oral daily  regadenoson Injectable 0.4 milliGRAM(s) IV Push once    PRN MEDICATIONS      VITAL SIGNS: Last 24 Hours  T(C): 36.1 (11 Jul 2022 15:44), Max: 36.7 (11 Jul 2022 05:39)  T(F): 96.9 (11 Jul 2022 15:44), Max: 98 (11 Jul 2022 05:39)  HR: 117 (11 Jul 2022 17:49) (82 - 118)  BP: 163/94 (11 Jul 2022 17:49) (144/93 - 204/119)  BP(mean): --  RR: 18 (11 Jul 2022 15:44) (18 - 18)  SpO2: 97% (11 Jul 2022 08:10) (97% - 97%)    LABS:                        16.4   7.57  )-----------( 156      ( 10 Jul 2022 04:30 )             45.9     07-10    139  |  100  |  16  ----------------------------<  93  3.7   |  28  |  0.8    Ca    8.6      10 Jul 2022 04:30  Mg     1.7     07-10    TPro  6.2  /  Alb  3.8  /  TBili  0.8  /  DBili  x   /  AST  14  /  ALT  17  /  AlkPhos  60  07-10              CARDIAC MARKERS ( 10 Jul 2022 12:20 )  x     / <0.01 ng/mL / x     / x     / x            RADIOLOGY:    < from: Xray Chest 1 View- PORTABLE-Urgent (07.08.22 @ 15:43) >  Impression:    Cardiomegaly with left lung atelectasis.    < end of copied text >    PHYSICAL EXAM:    GENERAL: NAD, well-developed, AAOx3  HEENT:  Atraumatic, Normocephalic. EOMI, PERRLA, conjunctiva and sclera clear, No JVD  PULMONARY: Clear to auscultation bilaterally; No wheeze  CARDIOVASCULAR: Irregular rate and rhythm; No murmurs, rubs, or gallops  GASTROINTESTINAL: Soft, Nontender, Nondistended; Bowel sounds present  MUSCULOSKELETAL:  2+ Peripheral Pulses, No clubbing, cyanosis, or edema  NEUROLOGY: non-focal  SKIN: No rashes or lesions       PAMELA NEWBY 71y Male  MRN#: [id number]   CODE STATUS: FULL      SUBJECTIVE  Patient is a 71y old Male who presents with a chief complaint of Chest discomfort (11 Jul 2022 16:05)    Currently admitted to medicine with the primary diagnosis of A Flutter.    Today is hospital day 3d, and this morning he is laying in bed comfortably and reports no overnight events. Denies chest pain, shortness of breath.    OBJECTIVE  PAST MEDICAL & SURGICAL HISTORY  HTN (hypertension)    High cholesterol    Skin cyst    Obese    SOB (shortness of breath)    Edema of both ankles    H/O right knee surgery      ALLERGIES:  penicillin (Unknown)    HOME MEDICATIONS:  Home Medications:  Breo Ellipta 100 mcg-25 mcg/inh inhalation powder: 1 puff(s) inhaled once a day (08 Jul 2022 20:31)  Crestor 5 mg oral tablet: 1 tab(s) orally once a day (08 Jul 2022 20:31)  losartan 100 mg oral tablet: 1 tab(s) orally once a day (08 Jul 2022 20:31)  Metoprolol Succinate ER 50 mg oral tablet, extended release: 1 tab(s) orally once a day (08 Jul 2022 20:31)    MEDICATIONS:  STANDING MEDICATIONS  apixaban 5 milliGRAM(s) Oral every 12 hours  atorvastatin 20 milliGRAM(s) Oral at bedtime  hydrochlorothiazide 25 milliGRAM(s) Oral daily  losartan 100 milliGRAM(s) Oral daily  metoprolol tartrate 50 milliGRAM(s) Oral three times a day  NIFEdipine XL 60 milliGRAM(s) Oral daily  regadenoson Injectable 0.4 milliGRAM(s) IV Push once    PRN MEDICATIONS      VITAL SIGNS: Last 24 Hours  T(C): 36.1 (11 Jul 2022 15:44), Max: 36.7 (11 Jul 2022 05:39)  T(F): 96.9 (11 Jul 2022 15:44), Max: 98 (11 Jul 2022 05:39)  HR: 117 (11 Jul 2022 17:49) (82 - 118)  BP: 163/94 (11 Jul 2022 17:49) (144/93 - 204/119)  BP(mean): --  RR: 18 (11 Jul 2022 15:44) (18 - 18)  SpO2: 97% (11 Jul 2022 08:10) (97% - 97%)    LABS:                        16.4   7.57  )-----------( 156      ( 10 Jul 2022 04:30 )             45.9     07-10    139  |  100  |  16  ----------------------------<  93  3.7   |  28  |  0.8    Ca    8.6      10 Jul 2022 04:30  Mg     1.7     07-10    TPro  6.2  /  Alb  3.8  /  TBili  0.8  /  DBili  x   /  AST  14  /  ALT  17  /  AlkPhos  60  07-10              CARDIAC MARKERS ( 10 Jul 2022 12:20 )  x     / <0.01 ng/mL / x     / x     / x            RADIOLOGY:    < from: Xray Chest 1 View- PORTABLE-Urgent (07.08.22 @ 15:43) >  Impression:    Cardiomegaly with left lung atelectasis.    < end of copied text >    < from: TTE Echo Complete w/o Contrast w/ Doppler (07.09.22 @ 15:13) >      Summary:   1. LV Ejection Fraction by Oh's Method with a biplane EF of 44 %.   2. Severe asymmetric left ventricular hypertrophy.   3. The mitral in-flow pattern reveals no discernable A-wave, therefore   no comment on diastolic function can be made.   4. Mild to moderately enlarged left atrium.   5. Mildly enlarged right atrium.   6. Mild mitral valve regurgitation.   7. Mild tricuspid regurgitation.   8. Estimated pulmonary artery systolic pressure is 39.8 mmHg assuming a   right atrial pressure of 3 mmHg, which is consistent with borderline   pulmonary hypertension.    < end of copied text >      PHYSICAL EXAM:    GENERAL: NAD, well-developed, AAOx3  HEENT:  Atraumatic, Normocephalic. EOMI, PERRLA, conjunctiva and sclera clear, No JVD  PULMONARY: Clear to auscultation bilaterally; No wheeze  CARDIOVASCULAR: Irregular rate and rhythm; No murmurs, rubs, or gallops  GASTROINTESTINAL: Soft, Nontender, Nondistended; Bowel sounds present  MUSCULOSKELETAL:  2+ Peripheral Pulses, No clubbing, cyanosis, or edema  NEUROLOGY: non-focal  SKIN: No rashes or lesions       PAMELA NEWBY 71y Male  MRN#: [id number]   CODE STATUS: FULL      SUBJECTIVE    Patient is a 71y old Male who presents with a chief complaint of Chest discomfort (11 Jul 2022 16:05)    Currently admitted to medicine with the primary diagnosis of A Flutter.    Today is hospital day 3d, and this morning he is laying in bed comfortably and reports no overnight events. Denies chest pain, shortness of breath.    OBJECTIVE    PAST MEDICAL & SURGICAL HISTORY  HTN (hypertension)    High cholesterol    Skin cyst    Obese    SOB (shortness of breath)    Edema of both ankles    H/O right knee surgery      ALLERGIES:  penicillin (Unknown)    HOME MEDICATIONS:  Home Medications:  Breo Ellipta 100 mcg-25 mcg/inh inhalation powder: 1 puff(s) inhaled once a day (08 Jul 2022 20:31)  Crestor 5 mg oral tablet: 1 tab(s) orally once a day (08 Jul 2022 20:31)  losartan 100 mg oral tablet: 1 tab(s) orally once a day (08 Jul 2022 20:31)  Metoprolol Succinate ER 50 mg oral tablet, extended release: 1 tab(s) orally once a day (08 Jul 2022 20:31)    MEDICATIONS:  STANDING MEDICATIONS  apixaban 5 milliGRAM(s) Oral every 12 hours  atorvastatin 20 milliGRAM(s) Oral at bedtime  hydrochlorothiazide 25 milliGRAM(s) Oral daily  losartan 100 milliGRAM(s) Oral daily  metoprolol tartrate 50 milliGRAM(s) Oral three times a day  NIFEdipine XL 60 milliGRAM(s) Oral daily  regadenoson Injectable 0.4 milliGRAM(s) IV Push once    PRN MEDICATIONS      VITAL SIGNS: Last 24 Hours  T(C): 36.1 (11 Jul 2022 15:44), Max: 36.7 (11 Jul 2022 05:39)  T(F): 96.9 (11 Jul 2022 15:44), Max: 98 (11 Jul 2022 05:39)  HR: 117 (11 Jul 2022 17:49) (82 - 118)  BP: 163/94 (11 Jul 2022 17:49) (144/93 - 204/119)  BP(mean): --  RR: 18 (11 Jul 2022 15:44) (18 - 18)  SpO2: 97% (11 Jul 2022 08:10) (97% - 97%)    LABS:                        16.4   7.57  )-----------( 156      ( 10 Jul 2022 04:30 )             45.9     07-10    139  |  100  |  16  ----------------------------<  93  3.7   |  28  |  0.8    Ca    8.6      10 Jul 2022 04:30  Mg     1.7     07-10    TPro  6.2  /  Alb  3.8  /  TBili  0.8  /  DBili  x   /  AST  14  /  ALT  17  /  AlkPhos  60  07-10              CARDIAC MARKERS ( 10 Jul 2022 12:20 )  x     / <0.01 ng/mL / x     / x     / x            RADIOLOGY:    < from: Xray Chest 1 View- PORTABLE-Urgent (07.08.22 @ 15:43) >  Impression:    Cardiomegaly with left lung atelectasis.    < end of copied text >    < from: TTE Echo Complete w/o Contrast w/ Doppler (07.09.22 @ 15:13) >      Summary:   1. LV Ejection Fraction by Oh's Method with a biplane EF of 44 %.   2. Severe asymmetric left ventricular hypertrophy.   3. The mitral in-flow pattern reveals no discernable A-wave, therefore   no comment on diastolic function can be made.   4. Mild to moderately enlarged left atrium.   5. Mildly enlarged right atrium.   6. Mild mitral valve regurgitation.   7. Mild tricuspid regurgitation.   8. Estimated pulmonary artery systolic pressure is 39.8 mmHg assuming a   right atrial pressure of 3 mmHg, which is consistent with borderline   pulmonary hypertension.    < end of copied text >      PHYSICAL EXAM:    GENERAL: NAD, well-developed, AAOx3  HEENT:  Atraumatic, Normocephalic. EOMI, PERRLA, conjunctiva and sclera clear, No JVD  PULMONARY: Clear to auscultation bilaterally; No wheeze  CARDIOVASCULAR: Irregular rate and rhythm; No murmurs, rubs, or gallops  GASTROINTESTINAL: Soft, Nontender, Nondistended; Bowel sounds present  MUSCULOSKELETAL:  2+ Peripheral Pulses, No clubbing, cyanosis, or edema  NEUROLOGY: non-focal  SKIN: No rashes or lesions

## 2022-07-11 NOTE — PROGRESS NOTE ADULT - SUBJECTIVE AND OBJECTIVE BOX
71 y M HTN, DLD and afib/flutter noncompliant for medication, HTN, presurgical testing site here at [address] for neck cyst removal and was incidentally found to have aflutter on ekg at their facility. Patient only take metoprolol and crestor; patient states that he otherwise feels well and does not have sxs. Of note, pt was instructed to see EP but never followed up. Patient is a poor historian. Patient mentions having no significant cardiac history  or  heart failure. He is noncompliant with home meds (sometimes takes aspirin and sometimes not)     Currently admitted to medicine with the primary diagnosis of New onset atrial flutter    Today is hospital day 3d. Th patient had no acute overnight events. He denied any headache, dizziness, nausea, vomiting, chest pain, SOB, or palpitations. He was originally going to go for a ANTHONY/cardioversion, but after discussion with EP, it was decided the patient would go to cardiac cath. After discussion with the cath team, it was decided that it was not clinically recommended at this time and the patient was taken for cardioversion. He ultimately did not have the cardioversion because his blood pressure was too high. He was given nifedipine xl 60mg and will try again for cardioversion tomorrow. Eliquis was also continued.        PAST MEDICAL & SURGICAL HISTORY  HTN (hypertension)    High cholesterol    Skin cyst    Obese    SOB (shortness of breath)    Edema of both ankles    H/O right knee surgery                                              -----------------------------------------------------------  ALLERGIES:  penicillin (Unknown)                                            ------------------------------------------------------------    HOME MEDICATIONS  Home Medications:  Breo Ellipta 100 mcg-25 mcg/inh inhalation powder: 1 puff(s) inhaled once a day (08 Jul 2022 20:31)  Crestor 5 mg oral tablet: 1 tab(s) orally once a day (08 Jul 2022 20:31)  losartan 100 mg oral tablet: 1 tab(s) orally once a day (08 Jul 2022 20:31)  Metoprolol Succinate ER 50 mg oral tablet, extended release: 1 tab(s) orally once a day (08 Jul 2022 20:31)                           MEDICATIONS:  STANDING MEDICATIONS  apixaban 5 milliGRAM(s) Oral every 12 hours  atorvastatin 20 milliGRAM(s) Oral at bedtime  hydrochlorothiazide 25 milliGRAM(s) Oral daily  losartan 100 milliGRAM(s) Oral daily  metoprolol tartrate 50 milliGRAM(s) Oral three times a day  NIFEdipine XL 60 milliGRAM(s) Oral daily  regadenoson Injectable 0.4 milliGRAM(s) IV Push once    PRN MEDICATIONS                                            ------------------------------------------------------------  VITAL SIGNS: Last 24 Hours  T(C): 36.1 (11 Jul 2022 15:44), Max: 36.7 (11 Jul 2022 05:39)  T(F): 96.9 (11 Jul 2022 15:44), Max: 98 (11 Jul 2022 05:39)  HR: 115 (11 Jul 2022 15:44) (82 - 118)  BP: 165/88 (11 Jul 2022 15:44) (144/93 - 204/119)  BP(mean): --  RR: 18 (11 Jul 2022 15:44) (18 - 18)  SpO2: 97% (11 Jul 2022 08:10) (97% - 97%)      07-10-22 @ 07:01  -  07-11-22 @ 07:00  --------------------------------------------------------  IN: 725 mL / OUT: 1200 mL / NET: -475 mL    07-11-22 @ 07:01  -  07-11-22 @ 16:05  --------------------------------------------------------  IN: 0 mL / OUT: 150 mL / NET: -150 mL                                             --------------------------------------------------------------  LABS:                        16.4   7.57  )-----------( 156      ( 10 Jul 2022 04:30 )             45.9     07-10    139  |  100  |  16  ----------------------------<  93  3.7   |  28  |  0.8    Ca    8.6      10 Jul 2022 04:30  Mg     1.7     07-10    TPro  6.2  /  Alb  3.8  /  TBili  0.8  /  DBili  x   /  AST  14  /  ALT  17  /  AlkPhos  60  07-10    CARDIAC MARKERS ( 10 Jul 2022 12:20 )  x     / <0.01 ng/mL / x     / x     / x                                                  -------------------------------------------------------------  RADIOLOGY:  VA Duplex Lower Ext Vein Scan, Bilat (07.10.22 @ 08:43)   No evidence of deep venous thrombosis or superficial thrombophlebitis in   the bilateral lower extremities.    Xray Chest 1 View- PORTABLE-Urgent (07.08.22 @ 15:43)   Cardiomegaly with left lung atelectasis.                                            --------------------------------------------------------------    PHYSICAL EXAM:  GEN: No acute distress  LUNGS: Clear to auscultation bilaterally   HEART: irregular rate, S1/S2 present.    ABD: Soft, non-tender, non-distended.   EXT: No pedal edema, warm to touch, no discoloration  NEURO: AAOX3

## 2022-07-11 NOTE — CHART NOTE - NSCHARTNOTEFT_GEN_A_CORE
ANTHONY and CV canceled as patient BP was persistently elevated to 200/120   anesthesia postponed the procedure as this BP can put the patient at a risk of stroke.   Please control BP.  Patient to be scheduled for tomorrow if BP controlled. ANTHONY and CV canceled as patient BP was persistently elevated to 200/120   anesthesia postponed the procedure as this BP can put the patient at a risk of stroke.   Please control BP.  Patient to be scheduled for tomorrow if BP controlled. if not will communicate in the morning to rescheduled for Wednesday   NPO after midnight order in

## 2022-07-11 NOTE — CHART NOTE - NSCHARTNOTEFT_GEN_A_CORE
70 y/o w aflutter for merced /cardioversion.pt noted to have elevated bp 204/131  to 194/138 range on multiple measurement .pt bp noted also high on the floor . case postponed until bp optimized on the floor considering te risk of stroke with administration of anesthesia .case discussed w dr thomason;cleve .

## 2022-07-11 NOTE — PROGRESS NOTE ADULT - SUBJECTIVE AND OBJECTIVE BOX
PAMELA NEWBY  71y Male    INTERVAL HPI/OVERNIGHT EVENTS:    no new complaints except for brief episode of right sided chest discomfort that resolved  occasional SOB  ROS negative  awaiting possible cardiac cath today    T(F): 96.7 (22 @ 13:56), Max: 98 (22 @ 05:39)  HR: 114 (22 @ 13:56) (82 - 118)  BP: 195/111 (22 @ 13:56) (152/82 - 204/119)  RR: 18 (22 @ 13:56) (18 - 18)  SpO2: 97% (22 @ 08:10) (97% - 97%) on RA    I&O's Summary    10 Jul 2022 07:  -  2022 07:00  --------------------------------------------------------  IN: 725 mL / OUT: 1200 mL / NET: -475 mL    2022 07:01  -  2022 14:00  --------------------------------------------------------  IN: 0 mL / OUT: 150 mL / NET: -150 mL      Daily Weight in k.4 (2022 05:39)      PHYSICAL EXAM:  GENERAL: NAD  HEAD:  Normocephalic  EYES:  conjunctiva and sclera clear  ENMT: Moist mucous membranes  NECK: Supple  NERVOUS SYSTEM:  Alert, awake, Good concentration  CHEST/LUNG: decreased BS b/l  HEART: IRR, tachy  ABDOMEN: Soft, Nontender, Nondistended; Bowel sounds present  EXTREMITIES: decreased LE edema  SKIN: warm, dry    Consultant(s) Notes Reviewed:  [x ] YES  [ ] NO  Care Discussed with Consultants/Other Providers [ x] YES  [ ] NO    MEDICATIONS  (STANDING):  atorvastatin 20 milliGRAM(s) Oral at bedtime  hydrochlorothiazide 25 milliGRAM(s) Oral daily  losartan 100 milliGRAM(s) Oral daily  metoprolol tartrate 50 milliGRAM(s) Oral three times a day  regadenoson Injectable 0.4 milliGRAM(s) IV Push once    MEDICATIONS  (PRN):      Telemetry reviewed by me    LABS:                        16.4   7.57  )-----------( 156      ( 10 Jul 2022 04:30 )             45.9     07-10    139  |  100  |  16  ----------------------------<  93  3.7   |  28  |  0.8    Ca    8.6      10 Jul 2022 04:30  Mg     1.7     07-10    TPro  6.2  /  Alb  3.8  /  TBili  0.8  /  DBili  x   /  AST  14  /  ALT  17  /  AlkPhos  60  07-10      CARDIAC MARKERS ( 10 Jul 2022 12:20 )  x     / <0.01 ng/mL / x     / x     / x                Case discussed with residents and RN on rounds today    Care discussed with pt

## 2022-07-11 NOTE — PROGRESS NOTE ADULT - ASSESSMENT
71 y M HTN, DLD and afib/flutter noncompliant for medication, presented to presurgical testing site here at [facility] for neck cyst removal and was incidentally found to have aflutter on ekg at their facility.    # IMPRESSION  A Flutter with 2:1 block  Cardiomyopathy with EF of 44 %  Mild TR, borderdline pulm HTN  HTN/DM    # RECOMMENDATIONS  -c/w telemetry monitor   -2d ECHO reviwed  -cont with eliquis 5 mg BID  -cont with metoprolol 50 BID  -cont with losartan and HCTZ. Start Nifedipine 60mg XL  -cont with lipitor 20 mg dialy  -Pt refused NM stress test    -ANTHONY/CV cancelled today due to uncontrolled SBP>200 mmHg  -keep the pt NPO after midnight for ANTHONY/CV tomorrow if BP is under control 71 y M HTN, DLD and afib/flutter noncompliant for medication, presented to presurgical testing site here at [facility] for neck cyst removal and was incidentally found to have aflutter on ekg at their facility.    # IMPRESSION  A Flutter with 2:1 block  Cardiomyopathy with EF of 44 %  Mild TR, borderdline pulm HTN  HTN/DM    # RECOMMENDATIONS    -c/w telemetry monitor   -2d ECHO reviwed  -cont with eliquis 5 mg BID  -cont with metoprolol 50 BID  -cont with losartan and HCTZ. Start Nifedipine 60mg XL  -cont with lipitor 20 mg dialy  -Pt refused NM stress test    -ANTHONY/CV cancelled today due to uncontrolled SBP>200 mmHg  -keep the pt NPO after midnight for ANTHONY/CV tomorrow if BP is under control  Recommend outpatient f/u 2D echo to re-assess LV function. If still abnormal - will need outpatient ischemic w/u. C/w anticoagulation.

## 2022-07-12 PROCEDURE — 93312 ECHO TRANSESOPHAGEAL: CPT | Mod: 26,XU

## 2022-07-12 PROCEDURE — 92960 CARDIOVERSION ELECTRIC EXT: CPT

## 2022-07-12 PROCEDURE — 93325 DOPPLER ECHO COLOR FLOW MAPG: CPT | Mod: 26

## 2022-07-12 PROCEDURE — 93320 DOPPLER ECHO COMPLETE: CPT | Mod: 26

## 2022-07-12 PROCEDURE — 99232 SBSQ HOSP IP/OBS MODERATE 35: CPT

## 2022-07-12 PROCEDURE — 99233 SBSQ HOSP IP/OBS HIGH 50: CPT

## 2022-07-12 RX ORDER — APIXABAN 2.5 MG/1
5 TABLET, FILM COATED ORAL ONCE
Refills: 0 | Status: COMPLETED | OUTPATIENT
Start: 2022-07-12 | End: 2022-07-12

## 2022-07-12 RX ORDER — MAGNESIUM SULFATE 500 MG/ML
2 VIAL (ML) INJECTION ONCE
Refills: 0 | Status: COMPLETED | OUTPATIENT
Start: 2022-07-12 | End: 2022-07-12

## 2022-07-12 RX ADMIN — Medication 25 GRAM(S): at 18:24

## 2022-07-12 RX ADMIN — Medication 60 MILLIGRAM(S): at 05:38

## 2022-07-12 RX ADMIN — Medication 25 MILLIGRAM(S): at 05:37

## 2022-07-12 RX ADMIN — APIXABAN 5 MILLIGRAM(S): 2.5 TABLET, FILM COATED ORAL at 17:04

## 2022-07-12 RX ADMIN — LOSARTAN POTASSIUM 100 MILLIGRAM(S): 100 TABLET, FILM COATED ORAL at 05:38

## 2022-07-12 RX ADMIN — APIXABAN 5 MILLIGRAM(S): 2.5 TABLET, FILM COATED ORAL at 09:44

## 2022-07-12 RX ADMIN — Medication 50 MILLIGRAM(S): at 05:38

## 2022-07-12 RX ADMIN — ATORVASTATIN CALCIUM 20 MILLIGRAM(S): 80 TABLET, FILM COATED ORAL at 22:35

## 2022-07-12 NOTE — CHART NOTE - NSCHARTNOTEFT_GEN_A_CORE
POST OPERATIVE PROCEDURAL DOCUMENTATION  PRE-OP DIAGNOSIS:  Atrial Flutter     POST-OP DIAGNOSIS:  No evidence of DEANNA thrombus  Successful conversion to NSR after DCCV    PROCEDURE: Transesophageal echocardiogram    Primary Physician:   Assistant:    ANESTHESIA TYPE  [  ] General Anesthesia  [ x ] Conscious Sedation  [  ] Local/Regional    CONDITION  [  ] Critical  [  ] Serious  [  ] Fair  [x  ] Good    SPECIMENS REMOVED (IF APPLICABLE): N/A    IMPLANTS (IF APPLICABLE): None    ESTIMATED BLOOD LOSS: None    COMPLICATIONS: None      FINDINGS:    After risks and benefits of procedures were explained, informed consent was obtained and placed in chart. Refer to Anesthesia note for sedation details.  The ANTHONY probe was passed into the esophagus without difficulty.  Transesophageal and transgastric images were obtained.  The ANTHONY probe was removed without difficulty and examined.  There was no evidence for bleeding.  The patient tolerated the procedure well without any immediate ANTHONY-related complications.      Preliminary Findings:  LA: Enlarged   DEANNA: Left atrial appendage was clear of clot and smoke.  LV: LVEF was estimated at 40-45%  MV: Myxomatous mitral valve with prolapse. mild MR, no evidence of MS.   AV: No evidence of AI, no evidence of AS.   RA: Enlarged   TV: mild TR.   PV: no PI.   IAS: no PFO. No R-> L shunt.   There was mild, non-mobile atheroma seen in the thoracic aorta.         DIAGNOSIS/IMPRESSION:  No evidence of DEANNA thrombus  Successful conversion to NSR after DCCV      PLAN OF CARE:  Return to inpatient bed   cont with AC without interruption   will home metoprolol for now, patient bradycardic after the cardioversion   ischemic work up as outpatient CCTA vs cath in 1 month   Rest of the care per the primary team POST OPERATIVE PROCEDURAL DOCUMENTATION  PRE-OP DIAGNOSIS:  Atrial Flutter     POST-OP DIAGNOSIS:  No evidence of DEANNA thrombus  Successful conversion to NSR after DCCV    PROCEDURE: Transesophageal echocardiogram    Primary Physician:   Assistant:    ANESTHESIA TYPE  [  ] General Anesthesia  [ x ] Conscious Sedation  [  ] Local/Regional    CONDITION  [  ] Critical  [  ] Serious  [  ] Fair  [x  ] Good    SPECIMENS REMOVED (IF APPLICABLE): N/A    IMPLANTS (IF APPLICABLE): None    ESTIMATED BLOOD LOSS: None    COMPLICATIONS: None      FINDINGS:    After risks and benefits of procedures were explained, informed consent was obtained and placed in chart. Refer to Anesthesia note for sedation details.  The ANTHONY probe was passed into the esophagus without difficulty.  Transesophageal and transgastric images were obtained.  The ANTHONY probe was removed without difficulty and examined.  There was no evidence for bleeding.  The patient tolerated the procedure well without any immediate ANTHONY-related complications.      Preliminary Findings:  < from: Transesophageal Echocardiogram (07.12.22 @ 09:56) >    1. Left ventricular ejection fraction, by visual estimation, is 45 to   50%.   2. Mildly decreased global left ventricular systolic function.   3. Moderately increased LV wall thickness.   4. Myxomatous mitral valve.   5. Thickening of the anterior and posterior mitral valve leaflets.   6. Mild mitral valve regurgitation.   7. Left atrial enlargement.   8. Right atrial enlargement.   9. No left atrial appendage thrombus and normal left atrial appendage   velocities.  10. Saline contrast bubble study was negative, with no evidence of any   intracardiac shunt.  11. Dilatation of the aortic root.  12. The patient underwent biphasic synchronized electrical cardioversion   200 J x 1 attempt.      < end of copied text >            DIAGNOSIS/IMPRESSION:  No evidence of DEANNA thrombus  Successful conversion to NSR after DCCV      PLAN OF CARE:  Return to inpatient bed   cont with AC without interruption   will home metoprolol for now, patient bradycardic after the cardioversion   ischemic work up as outpatient CCTA vs cath in 1 month   Rest of the care per the primary team

## 2022-07-12 NOTE — PROGRESS NOTE ADULT - ASSESSMENT
70 y/o man with PMH of HTN, dyslipidemia on Crestor and Afib/flutter (only takes metoprolol) was sent to the ED from presurgical testing site at [address] [place] [address] for neck cyst removal when he was incidentally found to have Aflutter on the EKG. Pt is asymptomatic.     1. Afib/flutter with rapid ventricular rate  now s/p ANTHONY and cardioversion today to NSR  continue telemetry  metoprolol stopped due to bradycardia s/p cardioversion  apixaban 5mg bid  EP and cardio consults and f/u appreciated  ECHO: EF 44%, severe asymmetric LVH, borderline Pulm HTN  Lower EF could be due to tachycardia and/or underlying ischemia  CCTA vs cath in 1 month per cardio  troponins negative x 2  TSH 0.85    2. HTN - now better controlled   on losartan 100mg daily, HCTZ 25mg daily and nifedipine XL 60mg daily and monitor    3. Dyslipidemia on statin    4. COPD, h/o tobacco use  on Breo at home    5. DVT prophylaxis - apixaban         PROGRESS NOTE HANDOFF    Pending: monitor on telemetry s/p cardioversion, BP monitoring    pt informed of the plan of care    Disposition: home in 24 hrs if remains stable

## 2022-07-12 NOTE — PROGRESS NOTE ADULT - REASON FOR ADMISSION
Chest discomfort

## 2022-07-12 NOTE — PROGRESS NOTE ADULT - ASSESSMENT
71 y M HTN, DLD and afib/flutter noncompliant for medication, presented to presurgical testing site here at [facility] for neck cyst removal and was incidentally found to have aflutter on ekg at their facility.    # IMPRESSION  A Flutter with 2:1 block s/p ANTHONY/CV today currently in sinus  Cardiomyopathy with EF of 44 %  Mild TR, borderdline pulm HTN  HTN/DM    # RECOMMENDATIONS  -2d ECHO reviwed  -cont with eliquis 5 mg BID  -cont with metoprolol 50 BID  -cont with losartan and HCTZ and Nifedipine 60mg XL  -cont with lipitor 20 mg dialy  -Pt refused NM stress test    -s/p ANTHONY/CV today. Successfully cardioverted to sinus  - will need outpatient f/u for repeat echocardiogram and ischemic w/u in 4 weeks   71 y M HTN, DLD and afib/flutter noncompliant for medication, presented to presurgical testing site here at [facility] for neck cyst removal and was incidentally found to have aflutter on ekg at their facility.    # IMPRESSION  A Flutter with 2:1 block s/p ANTHONY/CV today currently in sinus  Cardiomyopathy with EF of 44 %  Mild TR, borderdline pulm HTN  HTN/DM    # RECOMMENDATIONS  -2d ECHO reviwed  -cont with eliquis 5 mg BID  -cont with metoprolol 50 BID  -cont with losartan and HCTZ and Nifedipine 60mg XL  -cont with lipitor 20 mg dialy  -Pt refused NM stress test    -s/p ANTHONY/CV today. Successfully cardioverted to sinus  - will need outpatient f/u for repeat echocardiogram, and possible ischemic w/u in 4 weeks

## 2022-07-12 NOTE — PROGRESS NOTE ADULT - PROVIDER SPECIALTY LIST ADULT
Cardiology
Hospitalist
Electrophysiology
Hospitalist
Hospitalist
Internal Medicine
Cardiology
Hospitalist

## 2022-07-12 NOTE — CHART NOTE - NSCHARTNOTEFT_GEN_A_CORE
Electrophysiology PA Note     s/p successful ANTHONY/DCCV  not further EP work up at this time  follow up with Dr Sharona Saunders in 1 month  [address] [place] [address], [address]  [phone]

## 2022-07-12 NOTE — PROGRESS NOTE ADULT - SUBJECTIVE AND OBJECTIVE BOX
PAMELA NEWBY  71y Male    INTERVAL HPI/OVERNIGHT EVENTS:    pt seen prior to cardioversion today - no complaints  Now s/p cardioversion and in NSR  ROS negative    T(F): 97.8 (22 @ 12:12), Max: 97.8 (22 @ 12:12)  HR: 74 (22 @ 12:12) (74 - 117)  BP: 128/71 (22 @ 12:12) (122/64 - 195/111)  RR: 18 (22 @ 12:12) (18 - 18)  SpO2: 96% (22 @ 09:56) (96% - 96%) on RA    I&O's Summary    2022 07:  -  2022 07:00  --------------------------------------------------------  IN: 480 mL / OUT: 850 mL / NET: -370 mL    2022 07:01  -  2022 12:56  --------------------------------------------------------  IN: 0 mL / OUT: 0 mL / NET: 0 mL        Daily Height in cm: 162.56 (2022 09:56)    Daily Weight in k (2022 05:11)      PHYSICAL EXAM:  GENERAL: NAD  HEAD:  Normocephalic  EYES:  conjunctiva and sclera clear  ENMT: Moist mucous membranes  NECK: Supple  NERVOUS SYSTEM:  Alert, awake, Good concentration  CHEST/LUNG: decreased BS b/l but CTA b/l  HEART: IRR  ABDOMEN: Soft, Nontender, Nondistended; Bowel sounds present  EXTREMITIES: No edema  SKIN: warm, dry    Consultant(s) Notes Reviewed:  [x ] YES  [ ] NO  Care Discussed with Consultants/Other Providers [ x] YES  [ ] NO    MEDICATIONS  (STANDING):  apixaban 5 milliGRAM(s) Oral every 12 hours  atorvastatin 20 milliGRAM(s) Oral at bedtime  hydrochlorothiazide 25 milliGRAM(s) Oral daily  losartan 100 milliGRAM(s) Oral daily  NIFEdipine XL 60 milliGRAM(s) Oral daily  regadenoson Injectable 0.4 milliGRAM(s) IV Push once    MEDICATIONS  (PRN):      Telemetry reviewed by me      RADIOLOGY & ADDITIONAL TESTS:    Imaging or report Personally Reviewed:  [x ] YES  [ ] NO    ANTHONY  Preliminary Findings:  LA: Enlarged   DEANNA: Left atrial appendage was clear of clot and smoke.  LV: LVEF was estimated at 40-45%  MV: Myxomatous mitral valve with prolapse. mild MR, no evidence of MS.   AV: No evidence of AI, no evidence of AS.   RA: Enlarged   TV: mild TR.   PV: no PI.   IAS: no PFO. No R-> L shunt.   There was mild, non-mobile atheroma seen in the thoracic aorta.         DIAGNOSIS/IMPRESSION:  No evidence of DEANNA thrombus  Successful conversion to NSR after DCCV      PLAN OF CARE:  Return to inpatient bed   cont with AC without interruption   will hold metoprolol for now, patient bradycardic after the cardioversion   ischemic work up as outpatient CCTA vs cath in 1 month         Case discussed with residents and RN on rounds today    Care discussed with pt

## 2022-07-12 NOTE — PROGRESS NOTE ADULT - SUBJECTIVE AND OBJECTIVE BOX
71 y M HTN, DLD and afib/flutter noncompliant for medication, HTN, presurgical testing site here at [facility] for neck cyst removal and was incidentally found to have aflutter on ekg at their facility. Patient only take metoprolol and crestor; patient states that he otherwise feels well and does not have sxs. Of note, pt was instructed to see EP but never followed up. Patient is a poor historian. Patient mentions having no significant cardiac history  or  heart failure. He is noncompliant with home meds (sometimes takes aspirin and sometimes not)     Currently admitted to medicine with the primary diagnosis of New onset atrial flutter    Today is hospital day 4d. Th patient had no acute overnight events. He denied any headache, dizziness, nausea, vomiting, chest pain, SOB, or palpitations. He was originally going to go for a ANTHONY/cardioversion yesterday, but his blood pressure was too high. This morning, his BP was controlled and he was able to go for ANTHONY/cardioversion.     PAST MEDICAL & SURGICAL HISTORY  HTN (hypertension)    High cholesterol    Skin cyst    Obese    SOB (shortness of breath)    Edema of both ankles    H/O right knee surgery                                           -----------------------------------------------------------    REVIEW OF SYSTEMS:  RESPIRATORY: No cough, shortness of breath  CARDIOVASCULAR: No chest pain, palpitations, or dizziness  GASTROINTESTINAL: No abdominal or epigastric pain. No nausea, vomiting, diarrhea or constipation  NEUROLOGICAL: No headaches  PSYCHIATRIC: No difficulty sleeping                                            ------------------------------------------------------------    ALLERGIES:  penicillin (Unknown)                                            ------------------------------------------------------------    HOME MEDICATIONS  Home Medications:  Breo Ellipta 100 mcg-25 mcg/inh inhalation powder: 1 puff(s) inhaled once a day (08 Jul 2022 20:31)  Crestor 5 mg oral tablet: 1 tab(s) orally once a day (08 Jul 2022 20:31)  losartan 100 mg oral tablet: 1 tab(s) orally once a day (08 Jul 2022 20:31)  Metoprolol Succinate ER 50 mg oral tablet, extended release: 1 tab(s) orally once a day (08 Jul 2022 20:31)                           MEDICATIONS:  STANDING MEDICATIONS  apixaban 5 milliGRAM(s) Oral every 12 hours  atorvastatin 20 milliGRAM(s) Oral at bedtime  hydrochlorothiazide 25 milliGRAM(s) Oral daily  losartan 100 milliGRAM(s) Oral daily  NIFEdipine XL 60 milliGRAM(s) Oral daily  regadenoson Injectable 0.4 milliGRAM(s) IV Push once    PRN MEDICATIONS                                            ------------------------------------------------------------  VITAL SIGNS: Last 24 Hours  T(C): 36.6 (12 Jul 2022 12:12), Max: 36.6 (12 Jul 2022 12:12)  T(F): 97.8 (12 Jul 2022 12:12), Max: 97.8 (12 Jul 2022 12:12)  HR: 74 (12 Jul 2022 12:12) (74 - 117)  BP: 128/71 (12 Jul 2022 12:12) (122/64 - 163/94)  BP(mean): --  RR: 18 (12 Jul 2022 12:12) (18 - 18)  SpO2: 96% (12 Jul 2022 09:56) (96% - 96%)      07-11-22 @ 07:01  -  07-12-22 @ 07:00  --------------------------------------------------------  IN: 480 mL / OUT: 850 mL / NET: -370 mL    07-12-22 @ 07:01  -  07-12-22 @ 17:17  --------------------------------------------------------  IN: 457 mL / OUT: 0 mL / NET: 457 mL      PHYSICAL EXAM:  GENERAL: NAD, well-groomed, well-developed  HEAD:  Atraumatic, Normocephalic  NERVOUS SYSTEM:  Alert & Oriented X3, Good concentration  CHEST/LUNG: Clear to auscultation bilaterally  HEART: Regular rate and rhythm  ABDOMEN: Soft, Nontender, Nondistended; Bowel sounds present

## 2022-07-12 NOTE — PROGRESS NOTE ADULT - ASSESSMENT
70 y/o man with PMH of HTN, dyslipidemia on Crestor and Afib/flutter (only takes metoprolol) was sent to the ED from presurgical testing site at [address] [place] [address] for neck cyst removal when he was incidentally found to have Aflutter on the EKG. Pt is asymptomatic.     #Afib/flutter with rapid ventricular rate  - on metoprolol 50mg tid and increase as BP tolerates for better rate control (<110)  - continue telemetry  - continue apixaban 5mg bid   - EP and cardio consults appreciated  - pt with PVCs  - ECHO: EF 44%, severe asymmetric LVH, borderline Pulm HTN  - Lower EF could be due to tachycardia and/or underlying ischemia  - CCTA not recommended due to Afib with RVR  - troponins negative x 2  - ANTHONY/cardioversion today by EP  - TSH 0.85    #HTN - uncontrolled  - on metoprolol 50mg tid, losartan 100mg daily, HCTZ 25mg daily   - started nifedipine xl 60mg for uncontrolled BP >180    #Dyslipidemia on statin    #COPD, h/o tobacco use  - on Breo at home    #DVT prophylaxis - continue with apixaban

## 2022-07-12 NOTE — PROGRESS NOTE ADULT - SUBJECTIVE AND OBJECTIVE BOX
PAMELA NEWBY 71y Male  MRN#: [id number]   CODE STATUS: FULL      SUBJECTIVE  Patient is a 71y old Male who presents with a chief complaint of Chest discomfort (12 Jul 2022 17:16)    Currently admitted to medicine with the primary diagnosis of A Flutter.    Today is hospital day 4d, and this morning he is laying in bed comfortably and reports no overnight events. Denies chest pain, shortness of breath.    OBJECTIVE  PAST MEDICAL & SURGICAL HISTORY  HTN (hypertension)    High cholesterol    Skin cyst    Obese    SOB (shortness of breath)    Edema of both ankles    H/O right knee surgery      ALLERGIES:  penicillin (Unknown)    HOME MEDICATIONS:  Home Medications:  Breo Ellipta 100 mcg-25 mcg/inh inhalation powder: 1 puff(s) inhaled once a day (08 Jul 2022 20:31)  Crestor 5 mg oral tablet: 1 tab(s) orally once a day (08 Jul 2022 20:31)  losartan 100 mg oral tablet: 1 tab(s) orally once a day (08 Jul 2022 20:31)  Metoprolol Succinate ER 50 mg oral tablet, extended release: 1 tab(s) orally once a day (08 Jul 2022 20:31)    MEDICATIONS:  STANDING MEDICATIONS  apixaban 5 milliGRAM(s) Oral every 12 hours  atorvastatin 20 milliGRAM(s) Oral at bedtime  hydrochlorothiazide 25 milliGRAM(s) Oral daily  losartan 100 milliGRAM(s) Oral daily  NIFEdipine XL 60 milliGRAM(s) Oral daily  regadenoson Injectable 0.4 milliGRAM(s) IV Push once    PRN MEDICATIONS      VITAL SIGNS: Last 24 Hours  T(C): 36.6 (12 Jul 2022 12:12), Max: 36.6 (12 Jul 2022 12:12)  T(F): 97.8 (12 Jul 2022 12:12), Max: 97.8 (12 Jul 2022 12:12)  HR: 74 (12 Jul 2022 12:12) (74 - 117)  BP: 128/71 (12 Jul 2022 12:12) (122/64 - 163/94)  BP(mean): --  RR: 18 (12 Jul 2022 12:12) (18 - 18)  SpO2: 96% (12 Jul 2022 09:56) (96% - 96%)      PHYSICAL EXAM:    GENERAL: NAD, well-developed, AAOx3  HEENT:  Atraumatic, Normocephalic. EOMI, PERRLA, conjunctiva and sclera clear, No JVD  PULMONARY: Clear to auscultation bilaterally; No wheeze  CARDIOVASCULAR: Irregular rate and rhythm; No murmurs, rubs, or gallops  GASTROINTESTINAL: Soft, Nontender, Nondistended; Bowel sounds present  MUSCULOSKELETAL:  2+ Peripheral Pulses, No clubbing, cyanosis, or edema  NEUROLOGY: non-focal  SKIN: No rashes or lesions         PAMELA NEWBY 71y Male  MRN#: [id number]   CODE STATUS: FULL      SUBJECTIVE:    Patient is a 71y old Male who presents with a chief complaint of Chest discomfort (12 Jul 2022 17:16)    Currently admitted to medicine with the primary diagnosis of A Flutter.    Today is hospital day 4d, and this morning he is laying in bed comfortably and reports no overnight events. Denies chest pain, shortness of breath.    OBJECTIVE  PAST MEDICAL & SURGICAL HISTORY  HTN (hypertension)    High cholesterol    Skin cyst    Obese    SOB (shortness of breath)    Edema of both ankles    H/O right knee surgery      ALLERGIES:  penicillin (Unknown)    HOME MEDICATIONS:  Home Medications:  Breo Ellipta 100 mcg-25 mcg/inh inhalation powder: 1 puff(s) inhaled once a day (08 Jul 2022 20:31)  Crestor 5 mg oral tablet: 1 tab(s) orally once a day (08 Jul 2022 20:31)  losartan 100 mg oral tablet: 1 tab(s) orally once a day (08 Jul 2022 20:31)  Metoprolol Succinate ER 50 mg oral tablet, extended release: 1 tab(s) orally once a day (08 Jul 2022 20:31)    MEDICATIONS:  STANDING MEDICATIONS  apixaban 5 milliGRAM(s) Oral every 12 hours  atorvastatin 20 milliGRAM(s) Oral at bedtime  hydrochlorothiazide 25 milliGRAM(s) Oral daily  losartan 100 milliGRAM(s) Oral daily  NIFEdipine XL 60 milliGRAM(s) Oral daily  regadenoson Injectable 0.4 milliGRAM(s) IV Push once    PRN MEDICATIONS      VITAL SIGNS: Last 24 Hours  T(C): 36.6 (12 Jul 2022 12:12), Max: 36.6 (12 Jul 2022 12:12)  T(F): 97.8 (12 Jul 2022 12:12), Max: 97.8 (12 Jul 2022 12:12)  HR: 74 (12 Jul 2022 12:12) (74 - 117)  BP: 128/71 (12 Jul 2022 12:12) (122/64 - 163/94)  BP(mean): --  RR: 18 (12 Jul 2022 12:12) (18 - 18)  SpO2: 96% (12 Jul 2022 09:56) (96% - 96%)      PHYSICAL EXAM:    GENERAL: NAD, well-developed, AAOx3  HEENT:  Atraumatic, Normocephalic. EOMI, PERRLA, conjunctiva and sclera clear, No JVD  PULMONARY: Clear to auscultation bilaterally; No wheeze  CARDIOVASCULAR: Irregular rate and rhythm; No murmurs, rubs, or gallops  GASTROINTESTINAL: Soft, Nontender, Nondistended; Bowel sounds present  MUSCULOSKELETAL:  2+ Peripheral Pulses, No clubbing, cyanosis, or edema  NEUROLOGY: non-focal  SKIN: No rashes or lesions

## 2022-07-13 ENCOUNTER — TRANSCRIPTION ENCOUNTER (OUTPATIENT)
Age: 72
End: 2022-07-13

## 2022-07-13 VITALS
HEART RATE: 70 BPM | OXYGEN SATURATION: 93 % | TEMPERATURE: 96 F | SYSTOLIC BLOOD PRESSURE: 136 MMHG | WEIGHT: 185.41 LBS | DIASTOLIC BLOOD PRESSURE: 66 MMHG | RESPIRATION RATE: 18 BRPM

## 2022-07-13 PROBLEM — L72.9 FOLLICULAR CYST OF THE SKIN AND SUBCUTANEOUS TISSUE, UNSPECIFIED: Chronic | Status: ACTIVE | Noted: 2022-07-08

## 2022-07-13 PROBLEM — R06.02 SHORTNESS OF BREATH: Chronic | Status: ACTIVE | Noted: 2022-07-08

## 2022-07-13 PROBLEM — M25.471 EFFUSION, RIGHT ANKLE: Chronic | Status: ACTIVE | Noted: 2022-07-08

## 2022-07-13 PROBLEM — E66.9 OBESITY, UNSPECIFIED: Chronic | Status: ACTIVE | Noted: 2022-07-08

## 2022-07-13 PROBLEM — Z00.00 ENCOUNTER FOR PREVENTIVE HEALTH EXAMINATION: Status: ACTIVE | Noted: 2022-07-13

## 2022-07-13 LAB
ALBUMIN SERPL ELPH-MCNC: 4.1 G/DL — SIGNIFICANT CHANGE UP (ref 3.5–5.2)
ALP SERPL-CCNC: 57 U/L — SIGNIFICANT CHANGE UP (ref 30–115)
ALT FLD-CCNC: 15 U/L — SIGNIFICANT CHANGE UP (ref 0–41)
ANION GAP SERPL CALC-SCNC: 12 MMOL/L — SIGNIFICANT CHANGE UP (ref 7–14)
AST SERPL-CCNC: 12 U/L — SIGNIFICANT CHANGE UP (ref 0–41)
BASOPHILS # BLD AUTO: 0.04 K/UL — SIGNIFICANT CHANGE UP (ref 0–0.2)
BASOPHILS NFR BLD AUTO: 0.6 % — SIGNIFICANT CHANGE UP (ref 0–1)
BILIRUB SERPL-MCNC: 0.5 MG/DL — SIGNIFICANT CHANGE UP (ref 0.2–1.2)
BUN SERPL-MCNC: 34 MG/DL — HIGH (ref 10–20)
CALCIUM SERPL-MCNC: 8.8 MG/DL — SIGNIFICANT CHANGE UP (ref 8.5–10.1)
CHLORIDE SERPL-SCNC: 101 MMOL/L — SIGNIFICANT CHANGE UP (ref 98–110)
CO2 SERPL-SCNC: 27 MMOL/L — SIGNIFICANT CHANGE UP (ref 17–32)
CREAT SERPL-MCNC: 1.3 MG/DL — SIGNIFICANT CHANGE UP (ref 0.7–1.5)
EGFR: 58 ML/MIN/1.73M2 — LOW
EOSINOPHIL # BLD AUTO: 0.08 K/UL — SIGNIFICANT CHANGE UP (ref 0–0.7)
EOSINOPHIL NFR BLD AUTO: 1.2 % — SIGNIFICANT CHANGE UP (ref 0–8)
GLUCOSE SERPL-MCNC: 99 MG/DL — SIGNIFICANT CHANGE UP (ref 70–99)
HCT VFR BLD CALC: 43.1 % — SIGNIFICANT CHANGE UP (ref 42–52)
HGB BLD-MCNC: 14.7 G/DL — SIGNIFICANT CHANGE UP (ref 14–18)
IMM GRANULOCYTES NFR BLD AUTO: 0.4 % — HIGH (ref 0.1–0.3)
LYMPHOCYTES # BLD AUTO: 1.87 K/UL — SIGNIFICANT CHANGE UP (ref 1.2–3.4)
LYMPHOCYTES # BLD AUTO: 27.1 % — SIGNIFICANT CHANGE UP (ref 20.5–51.1)
MAGNESIUM SERPL-MCNC: 2.2 MG/DL — SIGNIFICANT CHANGE UP (ref 1.8–2.4)
MCHC RBC-ENTMCNC: 28.9 PG — SIGNIFICANT CHANGE UP (ref 27–31)
MCHC RBC-ENTMCNC: 34.1 G/DL — SIGNIFICANT CHANGE UP (ref 32–37)
MCV RBC AUTO: 84.8 FL — SIGNIFICANT CHANGE UP (ref 80–94)
MONOCYTES # BLD AUTO: 0.62 K/UL — HIGH (ref 0.1–0.6)
MONOCYTES NFR BLD AUTO: 9 % — SIGNIFICANT CHANGE UP (ref 1.7–9.3)
NEUTROPHILS # BLD AUTO: 4.27 K/UL — SIGNIFICANT CHANGE UP (ref 1.4–6.5)
NEUTROPHILS NFR BLD AUTO: 61.7 % — SIGNIFICANT CHANGE UP (ref 42.2–75.2)
NRBC # BLD: 0 /100 WBCS — SIGNIFICANT CHANGE UP (ref 0–0)
PLATELET # BLD AUTO: 143 K/UL — SIGNIFICANT CHANGE UP (ref 130–400)
POTASSIUM SERPL-MCNC: 3.5 MMOL/L — SIGNIFICANT CHANGE UP (ref 3.5–5)
POTASSIUM SERPL-SCNC: 3.5 MMOL/L — SIGNIFICANT CHANGE UP (ref 3.5–5)
PROT SERPL-MCNC: 6.2 G/DL — SIGNIFICANT CHANGE UP (ref 6–8)
RBC # BLD: 5.08 M/UL — SIGNIFICANT CHANGE UP (ref 4.7–6.1)
RBC # FLD: 12.6 % — SIGNIFICANT CHANGE UP (ref 11.5–14.5)
SODIUM SERPL-SCNC: 140 MMOL/L — SIGNIFICANT CHANGE UP (ref 135–146)
WBC # BLD: 6.91 K/UL — SIGNIFICANT CHANGE UP (ref 4.8–10.8)
WBC # FLD AUTO: 6.91 K/UL — SIGNIFICANT CHANGE UP (ref 4.8–10.8)

## 2022-07-13 PROCEDURE — 99239 HOSP IP/OBS DSCHRG MGMT >30: CPT

## 2022-07-13 RX ORDER — METOPROLOL TARTRATE 50 MG
1 TABLET ORAL
Qty: 0 | Refills: 0 | DISCHARGE

## 2022-07-13 RX ORDER — NIFEDIPINE 30 MG
1 TABLET, EXTENDED RELEASE 24 HR ORAL
Qty: 30 | Refills: 0
Start: 2022-07-13 | End: 2022-08-11

## 2022-07-13 RX ADMIN — Medication 25 MILLIGRAM(S): at 06:27

## 2022-07-13 RX ADMIN — LOSARTAN POTASSIUM 100 MILLIGRAM(S): 100 TABLET, FILM COATED ORAL at 06:26

## 2022-07-13 RX ADMIN — APIXABAN 5 MILLIGRAM(S): 2.5 TABLET, FILM COATED ORAL at 06:26

## 2022-07-13 RX ADMIN — Medication 60 MILLIGRAM(S): at 06:27

## 2022-07-13 NOTE — DISCHARGE NOTE PROVIDER - PROVIDER TOKENS
PROVIDER:[TOKEN:[[id number]],FOLLOWUP:[2 weeks]],PROVIDER:[TOKEN:[[id number]],FOLLOWUP:[2 weeks]]

## 2022-07-13 NOTE — DISCHARGE NOTE NURSING/CASE MANAGEMENT/SOCIAL WORK - PATIENT PORTAL LINK FT
You can access the FollowMyHealth Patient Portal offered by [facility] by registering at the following website: [email or web address]. By joining [facility]’s FollowMyHealth portal, you will also be able to view your health information using other applications (apps) compatible with our system.

## 2022-07-13 NOTE — DISCHARGE NOTE PROVIDER - NSDCCPCAREPLAN_GEN_ALL_CORE_FT
PRINCIPAL DISCHARGE DIAGNOSIS  Diagnosis: New onset atrial flutter  Assessment and Plan of Treatment: You had new onset atrial flutter. You had an echo and cardioversion. Your heart is now in a regular rhythm. Please follow up with your PCP and cardiologist in 2 weeks.

## 2022-07-13 NOTE — DISCHARGE NOTE PROVIDER - NSDCMRMEDTOKEN_GEN_ALL_CORE_FT
apixaban 5 mg oral tablet: 1 tab(s) orally every 12 hours  Breo Ellipta 100 mcg-25 mcg/inh inhalation powder: 1 puff(s) inhaled once a day  Crestor 5 mg oral tablet: 1 tab(s) orally once a day  losartan 100 mg oral tablet: 1 tab(s) orally once a day  NIFEdipine 60 mg oral tablet, extended release: 1 tab(s) orally once a day

## 2022-07-13 NOTE — DISCHARGE NOTE NURSING/CASE MANAGEMENT/SOCIAL WORK - NSDCPEFALRISK_GEN_ALL_CORE
For information on Fall & Injury Prevention, visit: [email or web address] OR  [email or web address] OR  https://www.cdc.gov/steadi/patient.html

## 2022-07-13 NOTE — DISCHARGE NOTE PROVIDER - HOSPITAL COURSE
70 y/o man with PMH of HTN, dyslipidemia on Crestor and Afib/flutter was sent to the ED from presurgical testing site at [address] [place] [address] for neck cyst removal when he was incidentally found to have Aflutter on the EKG. Pt is asymptomatic. The patient was to have a ANTHONY/cardioversion on 7/11 but when he was with EP, they found that his blood pressure was too high and the procedure was cancelled. He was treated with nifedipine xl 60mg for better BP control. He went for ANTHONY/cardioversion on 7/12. The echo showed EF of 45-50%. mildly decreased global left ventricular systolic function, moderately increased LV wall thickness, myxomatous mitral valve, thickening of mitral valve leaflets, mild MV regurgitation, LA and RA enlargement, negative bubble study, and dilation of the aortic root. He underwent biphasic synchronized electrical cardioversion 200J x 1attempt. He was observed overnight and has been hemodynamically stable.

## 2022-07-13 NOTE — DISCHARGE NOTE PROVIDER - CARE PROVIDER_API CALL
Paty Fletcher  INTERNAL MEDICINE  [facility] [place] [address]  Phone: [phone]  Fax: [phone]  Follow Up Time: 2 weeks    Brandyn Lima)  Cardiovascular Disease; Internal Medicine; Interventional Cardiology  [address] [place] [address]  Phone: [phone]  Fax: [phone]  Follow Up Time: 2 weeks

## 2022-07-20 DIAGNOSIS — I48.91 UNSPECIFIED ATRIAL FIBRILLATION: ICD-10-CM

## 2022-07-20 DIAGNOSIS — J44.9 CHRONIC OBSTRUCTIVE PULMONARY DISEASE, UNSPECIFIED: ICD-10-CM

## 2022-07-20 DIAGNOSIS — I48.92 UNSPECIFIED ATRIAL FLUTTER: ICD-10-CM

## 2022-07-20 DIAGNOSIS — I08.1 RHEUMATIC DISORDERS OF BOTH MITRAL AND TRICUSPID VALVES: ICD-10-CM

## 2022-07-20 DIAGNOSIS — I42.9 CARDIOMYOPATHY, UNSPECIFIED: ICD-10-CM

## 2022-07-20 DIAGNOSIS — I10 ESSENTIAL (PRIMARY) HYPERTENSION: ICD-10-CM

## 2022-07-20 DIAGNOSIS — R60.0 LOCALIZED EDEMA: ICD-10-CM

## 2022-07-20 DIAGNOSIS — Z87.891 PERSONAL HISTORY OF NICOTINE DEPENDENCE: ICD-10-CM

## 2022-07-20 DIAGNOSIS — E78.5 HYPERLIPIDEMIA, UNSPECIFIED: ICD-10-CM

## 2022-07-20 DIAGNOSIS — Z88.0 ALLERGY STATUS TO PENICILLIN: ICD-10-CM

## 2022-07-20 DIAGNOSIS — Z91.14 PATIENT'S OTHER NONCOMPLIANCE WITH MEDICATION REGIMEN: ICD-10-CM

## 2022-08-08 ENCOUNTER — APPOINTMENT (OUTPATIENT)
Dept: CARDIOLOGY | Facility: CLINIC | Age: 72
End: 2022-08-08

## 2022-08-08 ENCOUNTER — RESULT CHARGE (OUTPATIENT)
Age: 72
End: 2022-08-08

## 2022-08-08 VITALS
HEART RATE: 69 BPM | WEIGHT: 182 LBS | BODY MASS INDEX: 31.07 KG/M2 | SYSTOLIC BLOOD PRESSURE: 132 MMHG | HEIGHT: 64 IN | DIASTOLIC BLOOD PRESSURE: 74 MMHG

## 2022-08-08 DIAGNOSIS — Z78.9 OTHER SPECIFIED HEALTH STATUS: ICD-10-CM

## 2022-08-08 PROCEDURE — 99214 OFFICE O/P EST MOD 30 MIN: CPT

## 2022-08-08 PROCEDURE — 93000 ELECTROCARDIOGRAM COMPLETE: CPT

## 2022-08-08 RX ORDER — ROSUVASTATIN CALCIUM 5 MG/1
5 TABLET, FILM COATED ORAL DAILY
Qty: 90 | Refills: 1 | Status: ACTIVE | COMMUNITY
Start: 2022-03-17 | End: 1900-01-01

## 2022-08-08 RX ORDER — LOSARTAN POTASSIUM 50 MG/1
50 TABLET, FILM COATED ORAL
Qty: 180 | Refills: 0 | Status: COMPLETED | COMMUNITY
Start: 2022-07-26 | End: 2022-08-08

## 2022-08-08 RX ORDER — FLUTICASONE FUROATE AND VILANTEROL TRIFENATATE 100; 25 UG/1; UG/1
100-25 POWDER RESPIRATORY (INHALATION)
Qty: 180 | Refills: 0 | Status: ACTIVE | COMMUNITY
Start: 2022-06-14

## 2022-08-08 RX ORDER — APIXABAN 5 MG/1
5 TABLET, FILM COATED ORAL
Qty: 180 | Refills: 3 | Status: ACTIVE | COMMUNITY
Start: 2022-07-11 | End: 1900-01-01

## 2022-08-08 NOTE — HISTORY OF PRESENT ILLNESS
[FreeTextEntry1] : 70 y/o male with history of HTN, DL, A. fib/flutter, s/p recent admission for A. flutter, s/p ANTHONY cardioversion. No chest pain, dyspnea or syncope. No palpitations. + Edema - increased. Remains in NSR. Hospital records reviewed. last EF 45% by ANTHONY.

## 2022-08-08 NOTE — PHYSICAL EXAM

## 2022-08-08 NOTE — ASSESSMENT
[FreeTextEntry1] : 70 y/o male with A. flutter\par s/p ANTHONY CV\par Severe HTN - now improved with medical therapy\par Non-ischemic CM - likely tachy induced.\par Hospital records reviewed\par \par C/w medical therapy\par Add low dose Lasix\par C/w AC\par Repeat echo (TTE)\par If still depressed LV, will need ischemic w/u\par F/u after the test.\par Also if still with edema, will switch Nifedipine to an alternative.\par

## 2022-08-16 ENCOUNTER — APPOINTMENT (OUTPATIENT)
Dept: CARDIOLOGY | Facility: CLINIC | Age: 72
End: 2022-08-16

## 2022-08-16 PROCEDURE — 93306 TTE W/DOPPLER COMPLETE: CPT

## 2022-08-17 ENCOUNTER — APPOINTMENT (OUTPATIENT)
Dept: CARDIOLOGY | Facility: CLINIC | Age: 72
End: 2022-08-17

## 2022-08-29 ENCOUNTER — APPOINTMENT (OUTPATIENT)
Dept: CARDIOLOGY | Facility: CLINIC | Age: 72
End: 2022-08-29

## 2022-08-29 VITALS
OXYGEN SATURATION: 97 % | WEIGHT: 180 LBS | HEIGHT: 64 IN | TEMPERATURE: 98 F | HEART RATE: 61 BPM | SYSTOLIC BLOOD PRESSURE: 130 MMHG | BODY MASS INDEX: 30.73 KG/M2 | DIASTOLIC BLOOD PRESSURE: 80 MMHG

## 2022-08-29 PROCEDURE — 99214 OFFICE O/P EST MOD 30 MIN: CPT

## 2022-08-29 PROCEDURE — 93000 ELECTROCARDIOGRAM COMPLETE: CPT

## 2022-08-29 NOTE — DISCUSSION/SUMMARY
[FreeTextEntry1] : We had an extensive conversation regarding the nature of atrial flutter, including potential etiologies, underlying pathophysiology and natural history of the disease. In addition, the potential risk of thromboembolic events and assessment of that risk were discussed. I have emphasized the importance of continuing anticoagulation.

## 2022-08-29 NOTE — CARDIOLOGY SUMMARY
[de-identified] : 8/29/2022 NSR (HR 61 bpm) [de-identified] : Repeat echo pending \par \par 2D Echo 7/9/2022 EF 44% Severe asymmetric LVH. Mild-mod LAE. Mild KIRK. Mild MR. \par ANTHONY 7/12/2022 EF 45-50% Successful cardioversion to NSR with 200J DCCV. Myxomatous mitral valve. Mild MR. LAE. KIRK. Aortic root dilatation.

## 2022-08-29 NOTE — HISTORY OF PRESENT ILLNESS
[FreeTextEntry1] : \par Cardiologist: Dr. Lima\par \par 72 yo M with history of HTN, HL send from [facility] to [facility] ER for new onset atrial flutter. Patient was at [facility] for lipoma resection and was found to have AFl with RVR ( bpm). He was admitted 7/8-7/13 and had ANTHONY/CV successfully to NSR. He was completely asymptomatic. C/o occasional ankle/leg swelling. Went for repeat echo 2 weeks ago (results pending)\par \par He denies chest pain, dyspnea, palpitations, dizziness, lightheadedness, presyncope or syncope.

## 2022-08-29 NOTE — ASSESSMENT
[FreeTextEntry1] : # Paroxysmal AFlutter\par - Currently in NSR. Was asymptomatic during the episodes. We discussed MCOT to assess for AF burden, but pt is not interested at this time. \par - Cont Eliquis 5mg PO BID for CHADS VASc at least 2 (CHF, Age > 65). No s/sx of bleeding. \par - Pulm referral for sleep apnea eval. \par \par # Cardiomyopathy\par - Cont GDMT\par - Follow up repeat echo\par - Follow up with Dr. Lima\par \par I have also advised the patient to go to the nearest emergency room if he experiences any chest pain, dyspnea, syncope, or has any other compelling symptoms.\par \par Follow up in 6-9 mo

## 2022-09-12 ENCOUNTER — APPOINTMENT (OUTPATIENT)
Dept: PULMONOLOGY | Facility: CLINIC | Age: 72
End: 2022-09-12

## 2022-09-12 VITALS
OXYGEN SATURATION: 97 % | BODY MASS INDEX: 32.44 KG/M2 | WEIGHT: 190 LBS | DIASTOLIC BLOOD PRESSURE: 86 MMHG | SYSTOLIC BLOOD PRESSURE: 125 MMHG | HEIGHT: 64 IN | HEART RATE: 62 BPM

## 2022-09-12 DIAGNOSIS — F17.201 NICOTINE DEPENDENCE, UNSPECIFIED, IN REMISSION: ICD-10-CM

## 2022-09-12 DIAGNOSIS — Z86.79 PERSONAL HISTORY OF OTHER DISEASES OF THE CIRCULATORY SYSTEM: ICD-10-CM

## 2022-09-12 DIAGNOSIS — Z87.891 PERSONAL HISTORY OF NICOTINE DEPENDENCE: ICD-10-CM

## 2022-09-12 PROCEDURE — 99204 OFFICE O/P NEW MOD 45 MIN: CPT

## 2022-09-12 NOTE — REASON FOR VISIT
[Initial] : an initial visit [Sleep Evaluation] : sleep evaluation [Nicotine Dependence] : nicotine dependence

## 2022-09-12 NOTE — HISTORY OF PRESENT ILLNESS
[Former] : former [>= 20 pack years] : >= 20 pack years [TextBox_4] : Mr. NEWBY is a 71 year  man with a medical history significant for prior severe tobacco abuse and hypertension presenting today to the clinic for sleep evaluation and a prolonged history of smoking. He was referred by Dr Saunders for JESUS workup.\par \par # Apnea Screening\par 	(  ) Snoring while asleep?\par 	(  ) Tiredness during the day?\par 	(  ) Observed overnight apnea?\par 	( x ) Pressure elevated?\par 	(  ) BMI > 35?\par 	( x ) Age > 50?\par 	(  ) Neck circumference >16in?\par 	( x ) Gender = male?\par \par \par pmh\par htn\par AF s/p defib\par asthma on breo\par \par psx\par knee\par \par sochx\par >1ppd x52y\par stopped 1.5y ago\par denies drugs\par occ: , lots of dust w/ cement and cutting stone [TextBox_11] : 1.5 [TextBox_13] : 52 [YearQuit] : 2021

## 2022-09-12 NOTE — PHYSICAL EXAM
[No Acute Distress] : no acute distress [Normal Oropharynx] : normal oropharynx [Normal Appearance] : normal appearance [No Neck Mass] : no neck mass [Normal Rate/Rhythm] : normal rate/rhythm [Normal S1, S2] : normal s1, s2 [No Murmurs] : no murmurs [No Resp Distress] : no resp distress [Clear to Auscultation Bilaterally] : clear to auscultation bilaterally [No Abnormalities] : no abnormalities [Benign] : benign [Normal Gait] : normal gait [No Cyanosis] : no cyanosis [FROM] : FROM [1+ Pitting] : 1+ pitting [Normal Color/ Pigmentation] : normal color/ pigmentation [No Focal Deficits] : no focal deficits [Oriented x3] : oriented x3 [Normal Affect] : normal affect [TextBox_105] : schamroth window intact, but some mild clubbing appears present

## 2022-09-19 ENCOUNTER — APPOINTMENT (OUTPATIENT)
Dept: CARDIOLOGY | Facility: CLINIC | Age: 72
End: 2022-09-19

## 2022-09-19 VITALS
SYSTOLIC BLOOD PRESSURE: 146 MMHG | HEIGHT: 64 IN | TEMPERATURE: 97.7 F | DIASTOLIC BLOOD PRESSURE: 84 MMHG | BODY MASS INDEX: 31.92 KG/M2 | HEART RATE: 55 BPM | WEIGHT: 187 LBS

## 2022-09-19 PROCEDURE — 93000 ELECTROCARDIOGRAM COMPLETE: CPT

## 2022-09-19 PROCEDURE — 99214 OFFICE O/P EST MOD 30 MIN: CPT

## 2022-09-19 NOTE — ASSESSMENT
[FreeTextEntry1] : 70 y/o male with A. flutter\par s/p ANTHONY CV\par Severe HTN - now improved with medical therapy\par Non-ischemic CM - likely tachy induced. Improved. Echo results discussed.\par Hospital records reviewed\par \par C/w medical therapy\par C/w low dose Lasix\par C/w AC\par Repeat echo (TTE) noted.\par \par Still with edema - will switch Nifedipine to another agent.\par Patient was advised about healthy lifestyle changes, including diet and exercise. Importance of sustained long-term weight loss was discussed, questions answered.\par F/u with pulmonary for sleep test to r/o JESUS.\par

## 2022-09-19 NOTE — PHYSICAL EXAM

## 2022-09-20 ENCOUNTER — RESULT REVIEW (OUTPATIENT)
Age: 72
End: 2022-09-20

## 2022-09-20 ENCOUNTER — OUTPATIENT (OUTPATIENT)
Dept: OUTPATIENT SERVICES | Facility: HOSPITAL | Age: 72
LOS: 1 days | Discharge: HOME | End: 2022-09-20

## 2022-09-20 DIAGNOSIS — F17.211 NICOTINE DEPENDENCE, CIGARETTES, IN REMISSION: ICD-10-CM

## 2022-09-20 DIAGNOSIS — Z98.890 OTHER SPECIFIED POSTPROCEDURAL STATES: Chronic | ICD-10-CM

## 2022-09-20 DIAGNOSIS — F17.201 NICOTINE DEPENDENCE, UNSPECIFIED, IN REMISSION: ICD-10-CM

## 2022-09-20 PROCEDURE — 71271 CT THORAX LUNG CANCER SCR C-: CPT | Mod: 26

## 2023-01-09 ENCOUNTER — RESULT CHARGE (OUTPATIENT)
Age: 73
End: 2023-01-09

## 2023-01-09 ENCOUNTER — APPOINTMENT (OUTPATIENT)
Dept: CARDIOLOGY | Facility: CLINIC | Age: 73
End: 2023-01-09
Payer: MEDICARE

## 2023-01-09 VITALS
HEART RATE: 82 BPM | SYSTOLIC BLOOD PRESSURE: 136 MMHG | DIASTOLIC BLOOD PRESSURE: 80 MMHG | WEIGHT: 190 LBS | BODY MASS INDEX: 32.61 KG/M2

## 2023-01-09 PROCEDURE — 93000 ELECTROCARDIOGRAM COMPLETE: CPT

## 2023-01-09 PROCEDURE — 99213 OFFICE O/P EST LOW 20 MIN: CPT

## 2023-01-09 RX ORDER — FUROSEMIDE 20 MG/1
20 TABLET ORAL DAILY
Qty: 90 | Refills: 0 | Status: DISCONTINUED | COMMUNITY
Start: 2022-08-08 | End: 2023-01-09

## 2023-01-09 RX ORDER — METOPROLOL SUCCINATE 50 MG/1
50 TABLET, EXTENDED RELEASE ORAL DAILY
Qty: 90 | Refills: 3 | Status: DISCONTINUED | COMMUNITY
Start: 2022-06-14 | End: 2023-01-09

## 2023-01-09 NOTE — ASSESSMENT
[FreeTextEntry1] : 71 y/o male with A. flutter\par s/p ANTHONY CV\par Severe HTN - now improved with medical therapy\par Non-ischemic CM - likely tachy induced. Improved. Echo results discussed.\par Hospital records reviewed\par \par C/w medical therapy\par C/w low dose Lasix\par C/w AC\par Repeat echo (TTE) noted.\par \par Still with edema - will switch Nifedipine to another agent.\par Patient was advised about healthy lifestyle changes, including diet and exercise. Importance of sustained long-term weight loss was discussed, questions answered.\par F/u with pulmonary for sleep test to r/o JESUS.\par

## 2023-01-09 NOTE — HISTORY OF PRESENT ILLNESS
[FreeTextEntry1] : 71 y/o male with history of HTN, DL, A. fib/flutter, s/p recent admission for A. flutter, s/p ANTHONY cardioversion. No chest pain, dyspnea or syncope. No palpitations. + Edema - increased. Remains in NSR. Hospital records reviewed. last EF 45% by ANTHONY. Repeat echo - normal EF. C/o knee pain.

## 2023-01-09 NOTE — PHYSICAL EXAM

## 2023-01-18 NOTE — PATIENT PROFILE ADULT - CENTRAL VENOUS CATHETER/PICC LINE
Lab Results   Component Value Date    HGBA1C 5 8 01/18/2023     Controlled  Low carb diet  Continue metformin and ozempic  no

## 2023-02-08 NOTE — CONSULT NOTE ADULT - NS ATTEND BILL GEN_ALL_CORE
Ambulatory Care Management Note        Date/Time:  2/8/2023 10:53 AM    This patient was received as a referral from Daily assignment for case management. 2nd attempted to outreach to patient for case management. Unable to reach patient. Another message left with contact information requesting a return call. Ambulatory Care Management get in touch letter sent via [phone] Will continue to outreach per protocol. Attending to bill

## 2023-03-13 ENCOUNTER — APPOINTMENT (OUTPATIENT)
Dept: PULMONOLOGY | Facility: CLINIC | Age: 73
End: 2023-03-13

## 2023-04-24 ENCOUNTER — APPOINTMENT (OUTPATIENT)
Dept: ELECTROPHYSIOLOGY | Facility: CLINIC | Age: 73
End: 2023-04-24

## 2023-07-10 ENCOUNTER — APPOINTMENT (OUTPATIENT)
Dept: CARDIOLOGY | Facility: CLINIC | Age: 73
End: 2023-07-10
Payer: MEDICARE

## 2023-07-10 ENCOUNTER — RESULT CHARGE (OUTPATIENT)
Age: 73
End: 2023-07-10

## 2023-07-10 VITALS
SYSTOLIC BLOOD PRESSURE: 148 MMHG | BODY MASS INDEX: 32.1 KG/M2 | WEIGHT: 188 LBS | HEIGHT: 64 IN | DIASTOLIC BLOOD PRESSURE: 90 MMHG | HEART RATE: 73 BPM

## 2023-07-10 DIAGNOSIS — J43.2 CENTRILOBULAR EMPHYSEMA: ICD-10-CM

## 2023-07-10 DIAGNOSIS — I48.92 UNSPECIFIED ATRIAL FLUTTER: ICD-10-CM

## 2023-07-10 DIAGNOSIS — I42.0 DILATED CARDIOMYOPATHY: ICD-10-CM

## 2023-07-10 DIAGNOSIS — G47.33 OBSTRUCTIVE SLEEP APNEA (ADULT) (PEDIATRIC): ICD-10-CM

## 2023-07-10 PROCEDURE — 99214 OFFICE O/P EST MOD 30 MIN: CPT

## 2023-07-10 PROCEDURE — 93000 ELECTROCARDIOGRAM COMPLETE: CPT

## 2023-07-10 RX ORDER — METFORMIN ER 750 MG 750 MG/1
750 TABLET ORAL
Qty: 90 | Refills: 0 | Status: ACTIVE | COMMUNITY
Start: 2023-07-08

## 2023-07-10 NOTE — ASSESSMENT
[FreeTextEntry1] : 73 y/o male with A. flutter\par s/p ANTHONY CV\par Severe HTN - now improved with medical therapy\par Non-ischemic CM - likely tachy induced. Improved. Echo results discussed.\par Hospital records reviewed\par \par C/w medical therapy\par C/w low dose Lasix\par C/w AC\par Repeat echo (TTE) noted.\par \par No cardiac contraindications for planned low risk surgery. Hold Eliquis for 2-3 days pre-op.\par \par Patient was advised about healthy lifestyle changes, including diet and exercise. Importance of sustained long-term weight loss was discussed, questions answered.\par F/u with pulmonary for sleep test to r/o JESUS.\par

## 2023-07-10 NOTE — HISTORY OF PRESENT ILLNESS
[FreeTextEntry1] : 73 y/o male with history of HTN, DL, A. fib/flutter, s/p recent admission for A. flutter, s/p ANTHONY cardioversion. No chest pain, dyspnea or syncope. No palpitations. no edema.  Remains in NSR.  In  the hospital EF 45% \par TTE  08/16/22 EF 55%-60%  B/p today 148/90.  As per pr he just had coffee.  Needs clearance for lipoma resection on his neck.

## 2023-07-10 NOTE — PHYSICAL EXAM

## 2023-08-27 ENCOUNTER — NON-APPOINTMENT (OUTPATIENT)
Age: 73
End: 2023-08-27

## 2023-09-05 RX ORDER — VALSARTAN AND HYDROCHLOROTHIAZIDE 320; 25 MG/1; MG/1
320-25 TABLET, FILM COATED ORAL
Qty: 90 | Refills: 3 | Status: ACTIVE | COMMUNITY
Start: 2022-09-19 | End: 1900-01-01

## 2024-01-08 ENCOUNTER — APPOINTMENT (OUTPATIENT)
Dept: CARDIOLOGY | Facility: CLINIC | Age: 74
End: 2024-01-08

## 2024-03-04 ENCOUNTER — EMERGENCY (EMERGENCY)
Facility: HOSPITAL | Age: 74
LOS: 0 days | Discharge: HOME | End: 2024-03-04
Attending: EMERGENCY MEDICINE
Payer: MEDICARE

## 2024-03-04 VITALS
SYSTOLIC BLOOD PRESSURE: 145 MMHG | DIASTOLIC BLOOD PRESSURE: 68 MMHG | HEART RATE: 79 BPM | WEIGHT: 179.9 LBS | RESPIRATION RATE: 19 BRPM | TEMPERATURE: 98 F | HEIGHT: 64 IN | OXYGEN SATURATION: 97 %

## 2024-03-04 VITALS — DIASTOLIC BLOOD PRESSURE: 71 MMHG | HEART RATE: 90 BPM | SYSTOLIC BLOOD PRESSURE: 148 MMHG

## 2024-03-04 DIAGNOSIS — Z79.01 LONG TERM (CURRENT) USE OF ANTICOAGULANTS: ICD-10-CM

## 2024-03-04 DIAGNOSIS — Z98.890 OTHER SPECIFIED POSTPROCEDURAL STATES: Chronic | ICD-10-CM

## 2024-03-04 DIAGNOSIS — R42 DIZZINESS AND GIDDINESS: ICD-10-CM

## 2024-03-04 DIAGNOSIS — Z87.891 PERSONAL HISTORY OF NICOTINE DEPENDENCE: ICD-10-CM

## 2024-03-04 DIAGNOSIS — Z88.0 ALLERGY STATUS TO PENICILLIN: ICD-10-CM

## 2024-03-04 DIAGNOSIS — E78.5 HYPERLIPIDEMIA, UNSPECIFIED: ICD-10-CM

## 2024-03-04 DIAGNOSIS — R53.1 WEAKNESS: ICD-10-CM

## 2024-03-04 DIAGNOSIS — I48.91 UNSPECIFIED ATRIAL FIBRILLATION: ICD-10-CM

## 2024-03-04 DIAGNOSIS — I10 ESSENTIAL (PRIMARY) HYPERTENSION: ICD-10-CM

## 2024-03-04 LAB
ALBUMIN SERPL ELPH-MCNC: 4.4 G/DL — SIGNIFICANT CHANGE UP (ref 3.5–5.2)
ALP SERPL-CCNC: 59 U/L — SIGNIFICANT CHANGE UP (ref 30–115)
ALT FLD-CCNC: 19 U/L — SIGNIFICANT CHANGE UP (ref 0–41)
ANION GAP SERPL CALC-SCNC: 11 MMOL/L — SIGNIFICANT CHANGE UP (ref 7–14)
AST SERPL-CCNC: 19 U/L — SIGNIFICANT CHANGE UP (ref 0–41)
BASOPHILS # BLD AUTO: 0.05 K/UL — SIGNIFICANT CHANGE UP (ref 0–0.2)
BASOPHILS NFR BLD AUTO: 0.8 % — SIGNIFICANT CHANGE UP (ref 0–1)
BILIRUB SERPL-MCNC: 0.6 MG/DL — SIGNIFICANT CHANGE UP (ref 0.2–1.2)
BUN SERPL-MCNC: 37 MG/DL — HIGH (ref 10–20)
CALCIUM SERPL-MCNC: 9.1 MG/DL — SIGNIFICANT CHANGE UP (ref 8.4–10.5)
CHLORIDE SERPL-SCNC: 100 MMOL/L — SIGNIFICANT CHANGE UP (ref 98–110)
CO2 SERPL-SCNC: 29 MMOL/L — SIGNIFICANT CHANGE UP (ref 17–32)
CREAT SERPL-MCNC: 1 MG/DL — SIGNIFICANT CHANGE UP (ref 0.7–1.5)
EGFR: 78 ML/MIN/1.73M2 — SIGNIFICANT CHANGE UP
EOSINOPHIL # BLD AUTO: 0.03 K/UL — SIGNIFICANT CHANGE UP (ref 0–0.7)
EOSINOPHIL NFR BLD AUTO: 0.5 % — SIGNIFICANT CHANGE UP (ref 0–8)
GLUCOSE SERPL-MCNC: 108 MG/DL — HIGH (ref 70–99)
HCT VFR BLD CALC: 48.1 % — SIGNIFICANT CHANGE UP (ref 42–52)
HGB BLD-MCNC: 16.7 G/DL — SIGNIFICANT CHANGE UP (ref 14–18)
IMM GRANULOCYTES NFR BLD AUTO: 0.5 % — HIGH (ref 0.1–0.3)
LYMPHOCYTES # BLD AUTO: 1.78 K/UL — SIGNIFICANT CHANGE UP (ref 1.2–3.4)
LYMPHOCYTES # BLD AUTO: 28 % — SIGNIFICANT CHANGE UP (ref 20.5–51.1)
MCHC RBC-ENTMCNC: 29.4 PG — SIGNIFICANT CHANGE UP (ref 27–31)
MCHC RBC-ENTMCNC: 34.7 G/DL — SIGNIFICANT CHANGE UP (ref 32–37)
MCV RBC AUTO: 84.7 FL — SIGNIFICANT CHANGE UP (ref 80–94)
MONOCYTES # BLD AUTO: 0.36 K/UL — SIGNIFICANT CHANGE UP (ref 0.1–0.6)
MONOCYTES NFR BLD AUTO: 5.7 % — SIGNIFICANT CHANGE UP (ref 1.7–9.3)
NEUTROPHILS # BLD AUTO: 4.1 K/UL — SIGNIFICANT CHANGE UP (ref 1.4–6.5)
NEUTROPHILS NFR BLD AUTO: 64.5 % — SIGNIFICANT CHANGE UP (ref 42.2–75.2)
NRBC # BLD: 0 /100 WBCS — SIGNIFICANT CHANGE UP (ref 0–0)
PLATELET # BLD AUTO: 165 K/UL — SIGNIFICANT CHANGE UP (ref 130–400)
PMV BLD: 9.9 FL — SIGNIFICANT CHANGE UP (ref 7.4–10.4)
POTASSIUM SERPL-MCNC: 4.8 MMOL/L — SIGNIFICANT CHANGE UP (ref 3.5–5)
POTASSIUM SERPL-SCNC: 4.8 MMOL/L — SIGNIFICANT CHANGE UP (ref 3.5–5)
PROT SERPL-MCNC: 7.2 G/DL — SIGNIFICANT CHANGE UP (ref 6–8)
RBC # BLD: 5.68 M/UL — SIGNIFICANT CHANGE UP (ref 4.7–6.1)
RBC # FLD: 13 % — SIGNIFICANT CHANGE UP (ref 11.5–14.5)
SODIUM SERPL-SCNC: 140 MMOL/L — SIGNIFICANT CHANGE UP (ref 135–146)
TROPONIN T, HIGH SENSITIVITY RESULT: 26 NG/L — HIGH (ref 6–21)
TROPONIN T, HIGH SENSITIVITY RESULT: 27 NG/L — HIGH (ref 6–21)
WBC # BLD: 6.35 K/UL — SIGNIFICANT CHANGE UP (ref 4.8–10.8)
WBC # FLD AUTO: 6.35 K/UL — SIGNIFICANT CHANGE UP (ref 4.8–10.8)

## 2024-03-04 PROCEDURE — 84484 ASSAY OF TROPONIN QUANT: CPT

## 2024-03-04 PROCEDURE — 99285 EMERGENCY DEPT VISIT HI MDM: CPT | Mod: 25

## 2024-03-04 PROCEDURE — 85025 COMPLETE CBC W/AUTO DIFF WBC: CPT

## 2024-03-04 PROCEDURE — 70498 CT ANGIOGRAPHY NECK: CPT | Mod: 26,MC

## 2024-03-04 PROCEDURE — 70496 CT ANGIOGRAPHY HEAD: CPT | Mod: 26,MC

## 2024-03-04 PROCEDURE — 70450 CT HEAD/BRAIN W/O DYE: CPT | Mod: 26,MC,59

## 2024-03-04 PROCEDURE — 80053 COMPREHEN METABOLIC PANEL: CPT

## 2024-03-04 PROCEDURE — 99223 1ST HOSP IP/OBS HIGH 75: CPT | Mod: FS

## 2024-03-04 PROCEDURE — 70551 MRI BRAIN STEM W/O DYE: CPT | Mod: MC

## 2024-03-04 PROCEDURE — 93010 ELECTROCARDIOGRAM REPORT: CPT

## 2024-03-04 PROCEDURE — 36415 COLL VENOUS BLD VENIPUNCTURE: CPT

## 2024-03-04 PROCEDURE — G0378: CPT

## 2024-03-04 PROCEDURE — 70496 CT ANGIOGRAPHY HEAD: CPT | Mod: MC

## 2024-03-04 PROCEDURE — 70551 MRI BRAIN STEM W/O DYE: CPT | Mod: 26,MC

## 2024-03-04 PROCEDURE — 70450 CT HEAD/BRAIN W/O DYE: CPT | Mod: MC,XU

## 2024-03-04 PROCEDURE — 70498 CT ANGIOGRAPHY NECK: CPT | Mod: MC

## 2024-03-04 PROCEDURE — 93005 ELECTROCARDIOGRAM TRACING: CPT

## 2024-03-04 RX ORDER — APIXABAN 2.5 MG/1
5 TABLET, FILM COATED ORAL EVERY 12 HOURS
Refills: 0 | Status: DISCONTINUED | OUTPATIENT
Start: 2024-03-04 | End: 2024-03-04

## 2024-03-04 RX ORDER — VALSARTAN 80 MG/1
320 TABLET ORAL DAILY
Refills: 0 | Status: DISCONTINUED | OUTPATIENT
Start: 2024-03-04 | End: 2024-03-04

## 2024-03-04 RX ORDER — ATORVASTATIN CALCIUM 80 MG/1
20 TABLET, FILM COATED ORAL AT BEDTIME
Refills: 0 | Status: DISCONTINUED | OUTPATIENT
Start: 2024-03-04 | End: 2024-03-04

## 2024-03-04 RX ORDER — MECLIZINE HCL 12.5 MG
1 TABLET ORAL
Qty: 9 | Refills: 0
Start: 2024-03-04 | End: 2024-03-06

## 2024-03-04 RX ADMIN — VALSARTAN 320 MILLIGRAM(S): 80 TABLET ORAL at 17:54

## 2024-03-04 RX ADMIN — APIXABAN 5 MILLIGRAM(S): 2.5 TABLET, FILM COATED ORAL at 17:54

## 2024-03-04 NOTE — ED CDU PROVIDER DISPOSITION NOTE - NSFOLLOWUPINSTRUCTIONS_ED_ALL_ED_FT
Please make sure to follow up with your primary care doctor in 3 days.    Please make sure to follow up with ENT doctor for soft tissue mass in the right upper   neck subcutaneous region.      Dizziness      Dizziness is a common problem. It is a feeling of unsteadiness or light-headedness. You may feel like you are about to faint. Dizziness can lead to injury if you stumble or fall. Anyone can become dizzy, but dizziness is more common in older adults. This condition can be caused by a number of things, including medicines, dehydration, or illness.      Follow these instructions at home:      Eating and drinking   A comparison of three sample cups showing dark yellow, yellow, and pale yellow urine.   •Drink enough fluid to keep your urine pale yellow. This helps to keep you from becoming dehydrated. Try to drink more clear fluids, such as water.      • Do not drink alcohol.      •Limit your caffeine intake if told to do so by your health care provider. Check ingredients and nutrition facts to see if a food or beverage contains caffeine.      •Limit your salt (sodium) intake if told to do so by your health care provider. Check ingredients and nutrition facts to see if a food or beverage contains sodium.        Activity   A sign showing that a person should not drive. •Avoid making quick movements.  •Rise slowly from chairs and steady yourself until you feel okay.      •In the morning, first sit up on the side of the bed. When you feel okay, stand slowly while you hold onto something until you know that your balance is good.        •If you need to  one place for a long time, move your legs often. Tighten and relax the muscles in your legs while you are standing.      • Do not drive or use machinery if you feel dizzy.      •Avoid bending down if you feel dizzy. Place items in your home so that they are easy for you to reach without leaning over.      Lifestyle     • Do not use any products that contain nicotine or tobacco. These products include cigarettes, chewing tobacco, and vaping devices, such as e-cigarettes. If you need help quitting, ask your health care provider.      •Try to reduce your stress level by using methods such as yoga or meditation. Talk with your health care provider if you need help to manage your stress.      General instructions     •Watch your dizziness for any changes.      •Take over-the-counter and prescription medicines only as told by your health care provider. Talk with your health care provider if you think that your dizziness is caused by a medicine that you are taking.      •Tell a friend or a family member that you are feeling dizzy. If he or she notices any changes in your behavior, have this person call your health care provider.      •Keep all follow-up visits. This is important.        Contact a health care provider if:    •Your dizziness does not go away or you have new symptoms.      •Your dizziness or light-headedness gets worse.      •You feel nauseous.      •You have reduced hearing.      •You have a fever.      •You have neck pain or a stiff neck.      •Your dizziness leads to an injury or a fall.        Get help right away if:    •You vomit or have diarrhea and are unable to eat or drink anything.      •You have problems talking, walking, swallowing, or using your arms, hands, or legs.      •You feel generally weak.      •You have any bleeding.      •You are not thinking clearly or you have trouble forming sentences. It may take a friend or family member to notice this.      •You have chest pain, abdominal pain, shortness of breath, or sweating.      •Your vision changes or you develop a severe headache.      These symptoms may represent a serious problem that is an emergency. Do not wait to see if the symptoms will go away. Get medical help right away. Call your local emergency services (911 in the [place] Do not drive yourself to the hospital.       Summary    •Dizziness is a feeling of unsteadiness or light-headedness. This condition can be caused by a number of things, including medicines, dehydration, or illness.      •Anyone can become dizzy, but dizziness is more common in older adults.      •Drink enough fluid to keep your urine pale yellow. Do not drink alcohol.      •Avoid making quick movements if you feel dizzy. Monitor your dizziness for any changes.      This information is not intended to replace advice given to you by your health care provider. Make sure you discuss any questions you have with your health care provider.

## 2024-03-04 NOTE — ED ADULT NURSE NOTE - NSFALLHARMRISKINTERV_ED_ALL_ED

## 2024-03-04 NOTE — CONSULT NOTE ADULT - SUBJECTIVE AND OBJECTIVE BOX
NEUROLOGY CONSULT    HPI: 73M w/ PMH of HTN, afib s/p ablation on eliquis 5 mg BID, HLD who presents with dizziness. He states that dizziness comes and goes for the past month. The dizziness feels like room spinning. He also notes that when the dizziness occurs he feels he has no strength in his legs. He denies any dizziness currently. He denies any headache, tinnitus. He notes the dizziness comes on randomly and can last a couple hours. He has had about 5-6 episodes in the past month. He denies any numbness, or diplopia, or slurred speech. He reports compliance with his medications.      MEDICATIONS  Home Medications:  Breo Ellipta 100 mcg-25 mcg/inh inhalation powder: 1 puff(s) inhaled once a day (08 Jul 2022 20:31)  Crestor 5 mg oral tablet: 1 tab(s) orally once a day (08 Jul 2022 20:31)  losartan 100 mg oral tablet: 1 tab(s) orally once a day (08 Jul 2022 20:31)  eliquis 5 mg BID    MEDICATIONS  (STANDING):  apixaban 5 milliGRAM(s) Oral every 12 hours  atorvastatin 20 milliGRAM(s) Oral at bedtime  hydrochlorothiazide 25 milliGRAM(s) Oral daily  valsartan 320 milliGRAM(s) Oral daily    MEDICATIONS  (PRN):      FAMILY HISTORY:    SOCIAL HISTORY: negative for tobacco, alcohol, or ilicit drug use.    Allergies    penicillin (Unknown)    Intolerances        Neurological Examination:  General:  Appearance is consistent with chronologic age.   Cognitive/Language:  Awake, alert, and oriented to person, place, time and date.  Recent and remote memory intact.  Fund of knowledge is appropriate.  Naming, repetition and comprehension intact. Nondysarthric.    Cranial Nerves  - Eyes: Visual fields full.  EOMI w/o nystagmus, skew or reported double vision.  PERRL.  No ptosis/weakness of eyelid closure.    - Face:  Facial sensation normal V1 - 3, no facial asymmetry.    - Ears/Nose/Throat:  Hearing grossly intact b/l to finger rub.  Palate elevates midline.  Tongue and uvula midline.   Motor exam: Normal tone and bulk. No tenderness, twitching, tremors or involuntary movements.            Upper extremity                  Bicep     Tricep     HG                                                 R      5/5        5/5          5/5                                                    L       5/5        5/5          5/5              Lower extremity                   HF        KE        DF         PF                                                  R     5/5       5/5       5/5       5/5                                               L      5/5      5/5       5/5        5/5    Sensory examination:  Intact to light touch and pinprick, pain, temperature and proprioception and vibration in all extremities.  Reflexes: 2+ b/l biceps, triceps, patella and achilles.  Plantar response downgoing b/l.  Jefry, clonus absent.  Cerebellum: FTN/HKS intact.  No dysmetria.    Gait narrow based and normal. tandem gait intact, romberg intact    LABS:                        16.7   6.35  )-----------( 165      ( 04 Mar 2024 14:32 )             48.1     03-04    140  |  100  |  37<H>  ----------------------------<  108<H>  4.8   |  29  |  1.0    Ca    9.1      04 Mar 2024 14:32    TPro  7.2  /  Alb  4.4  /  TBili  0.6  /  DBili  x   /  AST  19  /  ALT  19  /  AlkPhos  59  03-04    Hemoglobin A1C:   Vitamin B12     CAPILLARY BLOOD GLUCOSE          Urinalysis Basic - ( 04 Mar 2024 14:32 )    Color: x / Appearance: x / SG: x / pH: x  Gluc: 108 mg/dL / Ketone: x  / Bili: x / Urobili: x   Blood: x / Protein: x / Nitrite: x   Leuk Esterase: x / RBC: x / WBC x   Sq Epi: x / Non Sq Epi: x / Bacteria: x            Microbiology:      RADIOLOGY, EKG AND ADDITIONAL TESTS: Reviewed.  < from: CT Head No Cont (03.04.24 @ 15:44) >    No evidence of acute intracranial pathology. Stable exam since 6/22/2018.      < end of copied text >  < from: CT Angio Neck w/ IV Cont (03.04.24 @ 15:45) >  1.  The left posterior inferior cerebellar artery is not visualized,   consider further evaluation with MRI if there is concern for posterior   fossa ischemia.    2.  Moderate stenosis of the vertebral artery origins. Additional mild   scattered atheromatous changes as above.    3.  Possible tiny (2 mm) aneurysm arising from the proximal A2 segment of   the right KATHRYN.    4.  Ovoid subcutaneoussoft tissue mass within the right dorsal neck   measuring 3.6 cm, indeterminate in etiology. Follow-up is recommended on   an outpatient/elective basis.    < end of copied text >           NEUROLOGY CONSULT    HPI: 73M w/ PMH of HTN, afib s/p ablation on eliquis 5 mg BID, HLD who presents with dizziness. He states that dizziness comes and goes for the past month. The dizziness feels like room spinning. He also notes that when the dizziness occurs he feels he has no strength in his legs. He denies any dizziness currently. He denies any headache, tinnitus. He notes the dizziness comes on randomly and can last a couple hours. He has had about 5-6 episodes in the past month. He denies any numbness, or diplopia, or slurred speech. He reports compliance with his medications.      MEDICATIONS  Home Medications:  Breo Ellipta 100 mcg-25 mcg/inh inhalation powder: 1 puff(s) inhaled once a day (08 Jul 2022 20:31)  Crestor 5 mg oral tablet: 1 tab(s) orally once a day (08 Jul 2022 20:31)  losartan 100 mg oral tablet: 1 tab(s) orally once a day (08 Jul 2022 20:31)  eliquis 5 mg BID    MEDICATIONS  (STANDING):  apixaban 5 milliGRAM(s) Oral every 12 hours  atorvastatin 20 milliGRAM(s) Oral at bedtime  hydrochlorothiazide 25 milliGRAM(s) Oral daily  valsartan 320 milliGRAM(s) Oral daily    MEDICATIONS  (PRN):      FAMILY HISTORY:    SOCIAL HISTORY: former smoker 25 pack yrs alcohol, or ilicit drug use.    Allergies    penicillin (Unknown)    Intolerances        Neurological Examination:  General:  Appearance is consistent with chronologic age.   Cognitive/Language:  Awake, alert, and oriented to person, place, time and date.  Recent and remote memory intact.  Fund of knowledge is appropriate.  Naming, repetition and comprehension intact. Nondysarthric.    Cranial Nerves  - Eyes: Visual fields full.  EOMI w/o nystagmus, skew or reported double vision.  PERRL.  No ptosis/weakness of eyelid closure.    - Face:  Facial sensation normal V1 - 3, no facial asymmetry.    - Ears/Nose/Throat:  Hearing grossly intact b/l to finger rub.  Palate elevates midline.  Tongue and uvula midline.   Motor exam: Normal tone and bulk. No tenderness, twitching, tremors or involuntary movements.            Upper extremity                  Bicep     Tricep     HG                                                 R      5/5        5/5          5/5                                                    L       5/5        5/5          5/5              Lower extremity                   HF        KE        DF         PF                                                  R     5/5       5/5       5/5       5/5                                               L      5/5      5/5       5/5        5/5    Sensory examination:  Intact to light touch and pinprick, pain, temperature and proprioception and vibration in all extremities.  Reflexes: 2+ b/l biceps, triceps, patella and achilles.  Plantar response downgoing b/l.  Jefry, clonus absent.  Cerebellum: FTN/HKS intact.  No dysmetria.    Gait narrow based and normal. tandem gait intact, romberg intact    LABS:                        16.7   6.35  )-----------( 165      ( 04 Mar 2024 14:32 )             48.1     03-04    140  |  100  |  37<H>  ----------------------------<  108<H>  4.8   |  29  |  1.0    Ca    9.1      04 Mar 2024 14:32    TPro  7.2  /  Alb  4.4  /  TBili  0.6  /  DBili  x   /  AST  19  /  ALT  19  /  AlkPhos  59  03-04    Hemoglobin A1C:   Vitamin B12     CAPILLARY BLOOD GLUCOSE          Urinalysis Basic - ( 04 Mar 2024 14:32 )    Color: x / Appearance: x / SG: x / pH: x  Gluc: 108 mg/dL / Ketone: x  / Bili: x / Urobili: x   Blood: x / Protein: x / Nitrite: x   Leuk Esterase: x / RBC: x / WBC x   Sq Epi: x / Non Sq Epi: x / Bacteria: x            Microbiology:      RADIOLOGY, EKG AND ADDITIONAL TESTS: Reviewed.  < from: CT Head No Cont (03.04.24 @ 15:44) >    No evidence of acute intracranial pathology. Stable exam since 6/22/2018.      < end of copied text >  < from: CT Angio Neck w/ IV Cont (03.04.24 @ 15:45) >  1.  The left posterior inferior cerebellar artery is not visualized,   consider further evaluation with MRI if there is concern for posterior   fossa ischemia.    2.  Moderate stenosis of the vertebral artery origins. Additional mild   scattered atheromatous changes as above.    3.  Possible tiny (2 mm) aneurysm arising from the proximal A2 segment of   the right KATHRYN.    4.  Ovoid subcutaneoussoft tissue mass within the right dorsal neck   measuring 3.6 cm, indeterminate in etiology. Follow-up is recommended on   an outpatient/elective basis.    < end of copied text >           NEUROLOGY CONSULT    HPI: 73M w/ PMH of HTN, afib s/p ablation on eliquis 5 mg BID, HLD who presents with dizziness. He states that dizziness comes and goes for the past month. The dizziness feels like room spinning. He also notes that when the dizziness occurs he feels he has no strength in his legs. He denies any dizziness currently. He denies any headache, tinnitus. He notes the dizziness comes on randomly and can last a couple hours. He has had about 5-6 episodes in the past month. He denies any numbness, or diplopia, or slurred speech. He reports compliance with his medications.      MEDICATIONS  Home Medications:  Breo Ellipta 100 mcg-25 mcg/inh inhalation powder: 1 puff(s) inhaled once a day (08 Jul 2022 20:31)  Crestor 5 mg oral tablet: 1 tab(s) orally once a day (08 Jul 2022 20:31)  losartan 100 mg oral tablet: 1 tab(s) orally once a day (08 Jul 2022 20:31)  eliquis 5 mg BID    MEDICATIONS  (STANDING):  apixaban 5 milliGRAM(s) Oral every 12 hours  atorvastatin 20 milliGRAM(s) Oral at bedtime  hydrochlorothiazide 25 milliGRAM(s) Oral daily  valsartan 320 milliGRAM(s) Oral daily    MEDICATIONS  (PRN):      FAMILY HISTORY:    SOCIAL HISTORY: former smoker 25 pack yrs alcohol, or ilicit drug use.    Allergies    penicillin (Unknown)    Intolerances        Neurological Examination:  General:  Appearance is consistent with chronologic age.   Cognitive/Language:  Awake, alert, and oriented to person, place, time and date.  Recent and remote memory intact.  Fund of knowledge is appropriate.  Naming, repetition and comprehension intact. Nondysarthric.    Cranial Nerves  - Eyes: Visual fields full.  EOMI w/o nystagmus, skew or reported double vision.  PERRL.  No ptosis/weakness of eyelid closure.    - Face:  Facial sensation normal V1 - 3, no facial asymmetry.    - Ears/Nose/Throat:  Hearing grossly intact b/l to finger rub.  Palate elevates midline.  Tongue and uvula midline.   Motor exam: Normal tone and bulk. No tenderness, twitching, tremors or involuntary movements.            Upper extremity                  Bicep     Tricep     HG                                                 R      5/5        5/5          5/5                                                    L       5/5        5/5          5/5              Lower extremity                   HF        KE        DF         PF                                                  R     5/5       5/5       5/5       5/5                                               L      5/5      5/5       5/5        5/5    Sensory examination:  Intact to light touch and pinprick, pain, temperature and proprioception and vibration in all extremities.  Reflexes: 2+ b/l biceps, triceps, patella and achilles.  Plantar response downgoing b/l.  Jefry, clonus absent.  Cerebellum: FTN/HKS intact.  No dysmetria.    Gait narrow based and normal. tandem gait intact, romberg intact    LABS:                        16.7   6.35  )-----------( 165      ( 04 Mar 2024 14:32 )             48.1     03-04    140  |  100  |  37<H>  ----------------------------<  108<H>  4.8   |  29  |  1.0    Ca    9.1      04 Mar 2024 14:32    TPro  7.2  /  Alb  4.4  /  TBili  0.6  /  DBili  x   /  AST  19  /  ALT  19  /  AlkPhos  59  03-04    Hemoglobin A1C:   Vitamin B12     CAPILLARY BLOOD GLUCOSE          Urinalysis Basic - ( 04 Mar 2024 14:32 )    Color: x / Appearance: x / SG: x / pH: x  Gluc: 108 mg/dL / Ketone: x  / Bili: x / Urobili: x   Blood: x / Protein: x / Nitrite: x   Leuk Esterase: x / RBC: x / WBC x   Sq Epi: x / Non Sq Epi: x / Bacteria: x            Microbiology:      RADIOLOGY, EKG AND ADDITIONAL TESTS: Reviewed.  < from: CT Head No Cont (03.04.24 @ 15:44) >    No evidence of acute intracranial pathology. Stable exam since 6/22/2018.      < end of copied text >  < from: CT Angio Neck w/ IV Cont (03.04.24 @ 15:45) >  1.  The left posterior inferior cerebellar artery is not visualized,   consider further evaluation with MRI if there is concern for posterior   fossa ischemia.    2.  Moderate stenosis of the vertebral artery origins. Additional mild   scattered atheromatous changes as above.    3.  Possible tiny (2 mm) aneurysm arising from the proximal A2 segment of   the right KATHRYN.    4.  Ovoid subcutaneous soft tissue mass within the right dorsal neck   measuring 3.6 cm, indeterminate in etiology. Follow-up is recommended on   an outpatient/elective basis.    < end of copied text >    < from: MR Head No Cont (03.04.24 @ 19:31) >  There is no evidence of acute intracranial pathology. No evidence of   acute infarct, intracranial hemorrhage or mass effect.    Mild chronic microvascular type changes.    Partially visualized indeterminate soft tissue mass in the right upper   neck subcutaneous region. A dedicated contrast-enhanced neck MRI can be   obtained for further evaluation.    < end of copied text >

## 2024-03-04 NOTE — ED PROVIDER NOTE - CHIEF COMPLAINT
The patient is a 73y Male complaining of chest pain. The patient is a 73y Male complaining of dizziness

## 2024-03-04 NOTE — CONSULT NOTE ADULT - ASSESSMENT
72yo male with PMHx AFIB s/p ANTHONY cardioversion, HTN, HLD, former smoker, presents c/o intermittent dizziness x a month .However today's episode is started at 830 am when he woke up. Admits to room spinning with changing positions and it lasting a few minutes. PE non focal. CTH negative for acute pathology, CTA as above. MR pending official read      suggestions:  follow up MR brain official read   obtain orthostatics  meclizine for dizziness  can follow up in Outpatient neurology clinic   Discussed with Dr Rosen  74yo male with PMHx AFIB s/p ANTHONY cardioversion, HTN, HLD, former smoker, presents c/o intermittent dizziness x a month .However today's episode is started at 830 am when he woke up. Admits to room spinning with changing positions and it lasting a few minutes. PE non focal. CTH negative for acute pathology, CTA as above. MR pending official read      suggestions:  follow up MR brain official read   obtain orthostatics  meclizine for dizziness  continue home Eliquis   can follow up in Outpatient neurology clinic   Discussed with Dr Rosen

## 2024-03-04 NOTE — ED PROVIDER NOTE - ATTENDING APP SHARED VISIT CONTRIBUTION OF CARE
73-year male history of hypertension dyslipidemia evaluation of dizziness.  Patient reports with sits up he was dizzy off balance.  He denies any chest pain shortness of breath.  Neuroexam patient no distress S1-S2 CTAB soft nontender normal gait patient sits up in bed feels off balance cranial nerves II through XII intact  Impression  Patient with dizziness seems positional in nature with labs and imaging reevaluation

## 2024-03-04 NOTE — ED CDU PROVIDER INITIAL DAY NOTE - PHYSICAL EXAMINATION
CONST: Well appearing in NAD  EYES: PERRL, EOMI, Sclera and conjunctiva clear.   ENT: Oropharynx normal appearing, no erythema or exudates. Uvula midline.  CARD: Normal S1 S2; Normal rate and rhythm  RESP: Equal BS B/L, No wheezes, rhonchi or rales. No distress  GI: Soft, non-tender, non-distended.  MS: Normal ROM in all extremities. No midline spinal tenderness.  SKIN: Warm, dry, no acute rashes. Good turgor  NEURO: A&Ox3, No focal deficits. Strength 5/5 with no sensory deficits. Steady gait. Appropriate-finger-to nose testing. No nystagmus noted.

## 2024-03-04 NOTE — ED CDU PROVIDER DISPOSITION NOTE - CLINICAL COURSE
Patient had initially presented to the ED with dizziness, workup in ED negative, placed in observation for MRI per neurology recommendation.  In observation, no acute events, MRI obtained and negative and patient subsequently cleared from neurology standpoint, ambulatory, tolerating p.o.  Given the above, will discharge home with outpatient follow up. Patient agreeable with plan. Agrees to return to ED for any new or worsening symptoms.
Refer to the Assessment tab to view/cancel completed assessment.

## 2024-03-04 NOTE — ED CDU PROVIDER INITIAL DAY NOTE - OBJECTIVE STATEMENT
72yo male with PMHx AFIB s/p ANTHONY cardioversion, HTN, HLD, former smoker, presents c/o intermittent dizziness x a few weeks, initially described as positional related, reporting "room spinning" when turning over in bed or when getting up out of bed. Pt reports however over the last one, more frequent episodes now related to walking. Pt states upon walking out of car today, felt dizzy and bilateral leg weakness, almost as if he could fall. Denies preceding or subsequent chest pain, HA, or SOB. Denies numbness/tingling to extremities.

## 2024-03-04 NOTE — ED ADULT NURSE NOTE - OBJECTIVE STATEMENT
pt presents to ED with complaints medical complaint. pt states he has problem of having legs shaking and at times his body shaking which stops over time. pt states the symptoms of shaking continued for a long time today which made him come to ED. pt states at times he would have SOB, headache that would come and go. pt denies chest pain, fever.

## 2024-03-04 NOTE — ED PROVIDER NOTE - CLINICAL SUMMARY MEDICAL DECISION MAKING FREE TEXT BOX
pt her with dizzyness, no stroke on ct, but vascular disease present place in obs for neuro consult and MRI brain

## 2024-03-04 NOTE — ED CDU PROVIDER DISPOSITION NOTE - PATIENT PORTAL LINK FT
You can access the FollowMyHealth Patient Portal offered by [facility] by registering at the following website: [email or web address]. By joining [employer]’s FollowMyHealth portal, you will also be able to view your health information using other applications (apps) compatible with our system.

## 2024-03-04 NOTE — ED PROVIDER NOTE - OBJECTIVE STATEMENT
74yo male with PMHx AFIB s/p ablation, HTN, HLD, former smoker, presents c/o intermittent dizziness x a few weeks, initially described as positional related, reporting "room spinning" when turning over in bed or when getting up out of bed. Pt reports however over the last one, more frequent episodes now related to walking. Pt states upon walking out of car today, felt dizzy and bilateral leg weakness, almost as if he could fall. Denies preceding or subsequent chest pain, HA, or SOB. Denies numbness/tingling to extremities. 74yo male with PMHx AFIB s/p ANTHONY cardioversion, HTN, HLD, former smoker, presents c/o intermittent dizziness x a few weeks, initially described as positional related, reporting "room spinning" when turning over in bed or when getting up out of bed. Pt reports however over the last one, more frequent episodes now related to walking. Pt states upon walking out of car today, felt dizzy and bilateral leg weakness, almost as if he could fall. Denies preceding or subsequent chest pain, HA, or SOB. Denies numbness/tingling to extremities.

## 2024-03-04 NOTE — CONSULT NOTE ADULT - SUBJECTIVE AND OBJECTIVE BOX
Neurology Consult    Patient is a 73y old  Male who presents with a chief complaint of dizziness     HPI: 72yo male with PMHx AFIB s/p ANTHONY cardioversion, HTN, HLD, former smoker, presents c/o intermittent dizziness x a few weeks, initially described as positional related, reporting "room spinning" when turning over in bed or when getting up out of bed. Pt reports however over the last one, more frequent episodes now related to walking. describes the room spinning as almost fainting.  Pt states upon walking out of car today, felt dizzy and bilateral leg weakness, almost as if he could fall. Additional episode noted after MRI as well. Denies preceding or subsequent chest pain, HA, or SOB. Denies numbness/tingling to extremities.      PAST MEDICAL & SURGICAL HISTORY:  HTN (hypertension)      High cholesterol      Skin cyst      Obese      SOB (shortness of breath)      Edema of both ankles      H/O right knee surgery          FAMILY HISTORY:      Social History: (-) x 3    Allergies    penicillin (Unknown)    Intolerances        MEDICATIONS  (STANDING):  apixaban 5 milliGRAM(s) Oral every 12 hours  atorvastatin 20 milliGRAM(s) Oral at bedtime  hydrochlorothiazide 25 milliGRAM(s) Oral daily  valsartan 320 milliGRAM(s) Oral daily    MEDICATIONS  (PRN):      Review of systems:    Constitutional: as per HPI  Neurological: As per HPI        Vital Signs Last 24 Hrs  T(C): 36.8 (04 Mar 2024 11:37), Max: 36.8 (04 Mar 2024 11:37)  T(F): 98.2 (04 Mar 2024 11:37), Max: 98.2 (04 Mar 2024 11:37)  HR: 90 (04 Mar 2024 17:12) (79 - 90)  BP: 148/71 (04 Mar 2024 17:12) (141/77 - 149/85)  BP(mean): --  RR: 19 (04 Mar 2024 11:37) (19 - 19)  SpO2: 97% (04 Mar 2024 11:37) (97% - 97%)    Parameters below as of 04 Mar 2024 11:37  Patient On (Oxygen Delivery Method): room air        Examination:  General:  Appearance is consistent with chronologic age.  No abnormal facies.  Gross skin survey within normal limits.    Cognitive/Language:  The patient is oriented to person, place, time and date.  Recent and remote memory intact.  Fund of knowledge is intact and normal.  Language with normal repetition, comprehension and naming.  Nondysarthric.    Eyes: intact VA, VFF.  EOMI w/o nystagmus.  No ptosis/weakness of eyelid closure.    Face:  Facial sensation normal V1 - 3, no facial asymmetry.    Ears/Nose/Throat:  Hearing grossly intact b/l.  Palate elevates midline.  Tongue and uvula midline.   Motor examination:   Normal tone, bulk and range of motion.  No tenderness, twitching, tremors or involuntary movements.  Formal Muscle Strength Testing: (MRC grade R/L) 5/5 UE; 5/5 LE.  No observable drift.  Sensory examination:   Intact to light touch  in all extremities.  Cerebellum:   FTN/HKS intact with normal LIEN in all limbs. gait deferred       Labs:   CBC Full  -  ( 04 Mar 2024 14:32 )  WBC Count : 6.35 K/uL  RBC Count : 5.68 M/uL  Hemoglobin : 16.7 g/dL  Hematocrit : 48.1 %  Platelet Count - Automated : 165 K/uL  Mean Cell Volume : 84.7 fL  Mean Cell Hemoglobin : 29.4 pg  Mean Cell Hemoglobin Concentration : 34.7 g/dL  Auto Neutrophil # : 4.10 K/uL  Auto Lymphocyte # : 1.78 K/uL  Auto Monocyte # : 0.36 K/uL  Auto Eosinophil # : 0.03 K/uL  Auto Basophil # : 0.05 K/uL  Auto Neutrophil % : 64.5 %  Auto Lymphocyte % : 28.0 %  Auto Monocyte % : 5.7 %  Auto Eosinophil % : 0.5 %  Auto Basophil % : 0.8 %    03-04    140  |  100  |  37<H>  ----------------------------<  108<H>  4.8   |  29  |  1.0    Ca    9.1      04 Mar 2024 14:32    TPro  7.2  /  Alb  4.4  /  TBili  0.6  /  DBili  x   /  AST  19  /  ALT  19  /  AlkPhos  59  03-04    LIVER FUNCTIONS - ( 04 Mar 2024 14:32 )  Alb: 4.4 g/dL / Pro: 7.2 g/dL / ALK PHOS: 59 U/L / ALT: 19 U/L / AST: 19 U/L / GGT: x             Urinalysis Basic - ( 04 Mar 2024 14:32 )    Color: x / Appearance: x / SG: x / pH: x  Gluc: 108 mg/dL / Ketone: x  / Bili: x / Urobili: x   Blood: x / Protein: x / Nitrite: x   Leuk Esterase: x / RBC: x / WBC x   Sq Epi: x / Non Sq Epi: x / Bacteria: x          Neuroimaging:  NCHCT: CT Head No Cont:   ACC: [id number] EXAM:  CT BRAIN   ORDERED BY: ROXIE DOTY     PROCEDURE DATE:  03/04/2024          INTERPRETATION:  Clinical History / Reason for exam: Dizziness    Technique: Noncontrast head CT.  Contiguous unenhanced CT axial images of   the head from the base to the vertex with coronal and sagittal reformats.    Comparison: CT head 6/22/2018    Findings:    The ventricles and cortical sulci are within normal limits for age.    There are patchy hypodensities throughout the hemispheric white matter   without mass effect compatible with mild chronic microvascular changes.    There is no acute intracranial hemorrhage, extra-axial fluid collection   or midline shift.  Gray white matter differentiation is maintained.    Vascular calcifications are noted.    There is mild patchy ethmoid and sphenoid sinus mucosal disease.   Remaining paranasal sinuses and the mastoids are clear.    IMPRESSION:    No evidence of acute intracranial pathology. Stable exam since 6/22/2018.    --- End of Report ---            MICHELLE CRENSHAW MD; Attending Radiologist  This document has been electronically signed. Mar  4 2024  4:05PM (03-04-24 @ 15:44)      03-04-24 @ 20:11    < from: CT Angio Neck w/ IV Cont (03.04.24 @ 15:45) >  IMPRESSION:    1.  The left posterior inferior cerebellar artery is not visualized,   consider further evaluation with MRI if there is concern for posterior   fossa ischemia.    2.  Moderate stenosis of the vertebral artery origins. Additional mild   scattered atheromatous changes as above.    3.  Possible tiny (2 mm) aneurysm arising from the proximal A2 segment of   the right KATHRYN.    4.  Ovoid subcutaneoussoft tissue mass within the right dorsal neck   measuring 3.6 cm, indeterminate in etiology. Follow-up is recommended on   an outpatient/elective basis.    < end of copied text >

## 2024-03-04 NOTE — ED CDU PROVIDER DISPOSITION NOTE - NSFOLLOWUPCLINICS_GEN_ALL_ED_FT
[facility]  ENT  [address], [address]nd [place] [address]  Phone: [phone]  Fax:   Follow Up Time: 1-3 Days    [facility]  Neurology  [address], [address]  [place] [address]  Phone: [phone]  Fax:   Follow Up Time: 1-3 Days

## 2024-03-04 NOTE — ED PROVIDER NOTE - PHYSICAL EXAMINATION
CONST: Well appearing in NAD  EYES: PERRL, EOMI, Sclera and conjunctiva clear.   ENT: Oropharynx normal appearing, no erythema or exudates. Uvula midline.  CARD: Normal S1 S2; Normal rate and rhythm  RESP: Equal BS B/L, No wheezes, rhonchi or rales. No distress  GI: Soft, non-tender, non-distended.  MS: Normal ROM in all extremities. No midline spinal tenderness.  SKIN: Warm, dry, no acute rashes. Good turgor  NEURO: A&Ox3, No focal deficits. Strength 5/5 with no sensory deficits. Steady gait CONST: Well appearing in NAD  EYES: PERRL, EOMI, Sclera and conjunctiva clear.   ENT: Oropharynx normal appearing, no erythema or exudates. Uvula midline.  CARD: Normal S1 S2; Normal rate and rhythm  RESP: Equal BS B/L, No wheezes, rhonchi or rales. No distress  GI: Soft, non-tender, non-distended.  MS: Normal ROM in all extremities. No midline spinal tenderness.  SKIN: Warm, dry, no acute rashes. Good turgor  NEURO: A&Ox3, No focal deficits. Strength 5/5 with no sensory deficits. Steady gait. Appropriate-finger-to nose testing. No nystagmus noted.

## 2024-03-04 NOTE — CONSULT NOTE ADULT - ASSESSMENT
73M w/ PMH of HTN, afib s/p ablation on eliquis 5 mg BID, HLD who presents with dizziness that feels like room spinning that comes and goes not associated with headache or tinnitus. Neurological exam currently normal. MR brain no acute infarction official report pending.    73M w/ PMH of HTN, afib s/p ablation on eliquis 5 mg BID, HLD who presents with dizziness that feels like room spinning that comes and goes not associated with headache or tinnitus. Neurological exam currently normal. MR brain shows no acute infarction.    73M w/ PMH of HTN, afib s/p ablation on eliquis 5 mg BID, HLD who presents with dizziness that feels like room spinning that comes and goes not associated with headache or tinnitus. Neurological exam currently normal. MR brain shows no acute infarction.     Recommendations  - outpatient vestibular therapy  - meclizine 25 mg BID prn for dizziness  - outpatient general neurology follow up    Case discussed with Dr. Rosen       73M w/ PMH of HTN, afib s/p ablation on eliquis 5 mg BID, HLD who presents with dizziness that feels like room spinning that comes and goes not associated with headache or tinnitus. Neurological exam currently normal. MR brain shows no acute infarction.     Recommendations  - outpatient vestibular therapy  - meclizine 25 mg BID prn for dizziness  - continue home eliquis  - outpatient general neurology follow up    Case discussed with Dr. Rosen

## 2024-03-04 NOTE — ED CDU PROVIDER INITIAL DAY NOTE - PROGRESS NOTE DETAILS
Patient resting comfortably, awaiting rpt trop and MRI. Labs unremarkable.  MRI normal.  Spoke with neurology and the patient was cleared.  Patient is stable for discharge.  Will have patient follow-up with ENT and neurology outpatient.  Will send medication to pharmacy.

## 2024-03-07 ENCOUNTER — APPOINTMENT (OUTPATIENT)
Dept: OTOLARYNGOLOGY | Facility: CLINIC | Age: 74
End: 2024-03-07
Payer: MEDICARE

## 2024-03-07 VITALS — WEIGHT: 185 LBS | BODY MASS INDEX: 31.58 KG/M2 | HEIGHT: 64 IN

## 2024-03-07 DIAGNOSIS — R42 DIZZINESS AND GIDDINESS: ICD-10-CM

## 2024-03-07 DIAGNOSIS — H90.11 CONDUCTIVE HEARING LOSS, UNILATERAL, RIGHT EAR, WITH UNRESTRICTED HEARING ON THE CONTRALATERAL SIDE: ICD-10-CM

## 2024-03-07 DIAGNOSIS — H93.8X3 OTHER SPECIFIED DISORDERS OF EAR, BILATERAL: ICD-10-CM

## 2024-03-07 DIAGNOSIS — H61.23 IMPACTED CERUMEN, BILATERAL: ICD-10-CM

## 2024-03-07 PROCEDURE — 69420 INCISION OF EARDRUM: CPT | Mod: RT

## 2024-03-07 PROCEDURE — 92550 TYMPANOMETRY & REFLEX THRESH: CPT | Mod: 52

## 2024-03-07 PROCEDURE — 92557 COMPREHENSIVE HEARING TEST: CPT

## 2024-03-07 PROCEDURE — 99204 OFFICE O/P NEW MOD 45 MIN: CPT | Mod: 25

## 2024-03-07 NOTE — HISTORY OF PRESENT ILLNESS
[Hearing Loss] : hearing loss [Vertigo] : vertigo [Hypertension] : hypertension [Smoking] : smoking [Cardiac Disease] : cardiac disease [Ear Fullness] : no ear fullness [de-identified] : Patient presents today c/o dizziness.  He was seen in ED on 03/04/24 for dizziness. First episode of dizziness started about 2 months. Felt like the room was spinning almost every morning when he get out of bed or more his head suddenly.  Still having dizziness when turning head. Dizziness lasts for 10-15 minutes. Will hive dizziness almost every day. Every time he turns on bed to the left, dizziness gets triggered. Was given meclizine but stopped taking medication because he didn't feel good. Has slight ear pressure, denies any ear discomfort. Quit smoking 3 years ago. 1 PPD. Has not gone to neurologist  [Tinnitus] : no tinnitus [Recurrent Otitis Media] : no recurrent otitis media [Meningitis] : no meningitis [Glomus Tumor] : no glomus tumor [Otitis Media with Effusion] : no otitis media with effusion [Presbycusis] : no presbycusis [Prior Ear Surgery] : no prior ear surgery [Acoustic Neuroma] : no acoustic neuroma [Facial Nerve Paralysis] : no facial nerve paralysis [Congenital Ear Malformation] : no congenital ear malformation [Major Depression] : no major depression [Diabetes] : no diabetes [Otosclerosis] : no otosclerosis [Cholesteatoma] : no cholesteatoma [Multiple Sclerosis] : no multiple sclerosis [Perilymphatic Fistula] : no perilymphatic fistula [Autoimmune Diseases] : no autoimmune diseases [Loud Noise Exposure] : no history of loud noise exposure [Hypotension] : no hypotension [Hx of Radiation Therapy] : no history of radiation therapy [Birth Prematurity] : no birth prematurity [Alcohol] : no consumption of alcohol [Narcotic/Benzodiazepine] : no use of narcotic/benzodiazepine [Meniere Disease] : no meniere disease [Early Onset Hearing Loss] : no early onset hearing loss [Autoimmune Disease] : no autoimmune disease [Stroke] : no stroke [FreeTextEntry2] : Feel like he has hearing loss of the right ear.  [de-identified] : stopped smoking 3 years ago. Used to smoke 1 PPD

## 2024-03-07 NOTE — PROCEDURE
[Same] : same as the Pre Op Dx. [Risk and Benefits Discussed] : The purpose, risks, discomforts, benefits and alternatives of the procedure have been explained to the patient including no treatment. [FreeTextEntry4] :  phenol  [] : Myringotomy [FreeTextEntry6] : effusion

## 2024-03-07 NOTE — PHYSICAL EXAM
[Normal] : palatine tonsils are normal [Midline] : trachea located in midline position [de-identified] : cerumen impaction in both ears removed with microsuction.

## 2024-04-01 ENCOUNTER — EMERGENCY (EMERGENCY)
Facility: HOSPITAL | Age: 74
LOS: 0 days | Discharge: HOME | End: 2024-04-01
Attending: EMERGENCY MEDICINE
Payer: MEDICARE

## 2024-04-01 VITALS
OXYGEN SATURATION: 96 % | HEIGHT: 64 IN | RESPIRATION RATE: 19 BRPM | SYSTOLIC BLOOD PRESSURE: 172 MMHG | HEART RATE: 68 BPM | WEIGHT: 179.9 LBS | DIASTOLIC BLOOD PRESSURE: 80 MMHG | TEMPERATURE: 98 F

## 2024-04-01 DIAGNOSIS — R00.1 BRADYCARDIA, UNSPECIFIED: ICD-10-CM

## 2024-04-01 DIAGNOSIS — Z98.890 OTHER SPECIFIED POSTPROCEDURAL STATES: Chronic | ICD-10-CM

## 2024-04-01 DIAGNOSIS — I48.91 UNSPECIFIED ATRIAL FIBRILLATION: ICD-10-CM

## 2024-04-01 DIAGNOSIS — R42 DIZZINESS AND GIDDINESS: ICD-10-CM

## 2024-04-01 DIAGNOSIS — Z87.891 PERSONAL HISTORY OF NICOTINE DEPENDENCE: ICD-10-CM

## 2024-04-01 DIAGNOSIS — E78.5 HYPERLIPIDEMIA, UNSPECIFIED: ICD-10-CM

## 2024-04-01 DIAGNOSIS — I10 ESSENTIAL (PRIMARY) HYPERTENSION: ICD-10-CM

## 2024-04-01 DIAGNOSIS — Z88.0 ALLERGY STATUS TO PENICILLIN: ICD-10-CM

## 2024-04-01 LAB
ALBUMIN SERPL ELPH-MCNC: 4.6 G/DL — SIGNIFICANT CHANGE UP (ref 3.5–5.2)
ALP SERPL-CCNC: 68 U/L — SIGNIFICANT CHANGE UP (ref 30–115)
ALT FLD-CCNC: 20 U/L — SIGNIFICANT CHANGE UP (ref 0–41)
ANION GAP SERPL CALC-SCNC: 9 MMOL/L — SIGNIFICANT CHANGE UP (ref 7–14)
AST SERPL-CCNC: 18 U/L — SIGNIFICANT CHANGE UP (ref 0–41)
BILIRUB SERPL-MCNC: 0.5 MG/DL — SIGNIFICANT CHANGE UP (ref 0.2–1.2)
BUN SERPL-MCNC: 22 MG/DL — HIGH (ref 10–20)
CALCIUM SERPL-MCNC: 9.4 MG/DL — SIGNIFICANT CHANGE UP (ref 8.4–10.5)
CHLORIDE SERPL-SCNC: 100 MMOL/L — SIGNIFICANT CHANGE UP (ref 98–110)
CO2 SERPL-SCNC: 33 MMOL/L — HIGH (ref 17–32)
CREAT SERPL-MCNC: 0.9 MG/DL — SIGNIFICANT CHANGE UP (ref 0.7–1.5)
EGFR: 89 ML/MIN/1.73M2 — SIGNIFICANT CHANGE UP
GLUCOSE SERPL-MCNC: 86 MG/DL — SIGNIFICANT CHANGE UP (ref 70–99)
HCT VFR BLD CALC: 49.5 % — SIGNIFICANT CHANGE UP (ref 42–52)
HGB BLD-MCNC: 17.1 G/DL — SIGNIFICANT CHANGE UP (ref 14–18)
MCHC RBC-ENTMCNC: 29.4 PG — SIGNIFICANT CHANGE UP (ref 27–31)
MCHC RBC-ENTMCNC: 34.5 G/DL — SIGNIFICANT CHANGE UP (ref 32–37)
MCV RBC AUTO: 85.1 FL — SIGNIFICANT CHANGE UP (ref 80–94)
NRBC # BLD: 0 /100 WBCS — SIGNIFICANT CHANGE UP (ref 0–0)
PLATELET # BLD AUTO: 163 K/UL — SIGNIFICANT CHANGE UP (ref 130–400)
PMV BLD: 9.7 FL — SIGNIFICANT CHANGE UP (ref 7.4–10.4)
POTASSIUM SERPL-MCNC: 4 MMOL/L — SIGNIFICANT CHANGE UP (ref 3.5–5)
POTASSIUM SERPL-SCNC: 4 MMOL/L — SIGNIFICANT CHANGE UP (ref 3.5–5)
PROT SERPL-MCNC: 7.6 G/DL — SIGNIFICANT CHANGE UP (ref 6–8)
RBC # BLD: 5.82 M/UL — SIGNIFICANT CHANGE UP (ref 4.7–6.1)
RBC # FLD: 12.9 % — SIGNIFICANT CHANGE UP (ref 11.5–14.5)
SODIUM SERPL-SCNC: 142 MMOL/L — SIGNIFICANT CHANGE UP (ref 135–146)
WBC # BLD: 8.63 K/UL — SIGNIFICANT CHANGE UP (ref 4.8–10.8)
WBC # FLD AUTO: 8.63 K/UL — SIGNIFICANT CHANGE UP (ref 4.8–10.8)

## 2024-04-01 PROCEDURE — 80053 COMPREHEN METABOLIC PANEL: CPT

## 2024-04-01 PROCEDURE — 93005 ELECTROCARDIOGRAM TRACING: CPT

## 2024-04-01 PROCEDURE — 99285 EMERGENCY DEPT VISIT HI MDM: CPT | Mod: 25

## 2024-04-01 PROCEDURE — 36415 COLL VENOUS BLD VENIPUNCTURE: CPT

## 2024-04-01 PROCEDURE — 85027 COMPLETE CBC AUTOMATED: CPT

## 2024-04-01 PROCEDURE — 70450 CT HEAD/BRAIN W/O DYE: CPT | Mod: MC

## 2024-04-01 PROCEDURE — 70450 CT HEAD/BRAIN W/O DYE: CPT | Mod: 26,MC

## 2024-04-01 PROCEDURE — 82962 GLUCOSE BLOOD TEST: CPT

## 2024-04-01 PROCEDURE — 93010 ELECTROCARDIOGRAM REPORT: CPT

## 2024-04-01 PROCEDURE — 99285 EMERGENCY DEPT VISIT HI MDM: CPT | Mod: GC

## 2024-04-01 RX ORDER — METOCLOPRAMIDE HCL 10 MG
10 TABLET ORAL ONCE
Refills: 0 | Status: COMPLETED | OUTPATIENT
Start: 2024-04-01 | End: 2024-04-01

## 2024-04-01 RX ORDER — ACETAMINOPHEN 500 MG
975 TABLET ORAL ONCE
Refills: 0 | Status: COMPLETED | OUTPATIENT
Start: 2024-04-01 | End: 2024-04-01

## 2024-04-01 RX ORDER — MECLIZINE HCL 12.5 MG
25 TABLET ORAL ONCE
Refills: 0 | Status: COMPLETED | OUTPATIENT
Start: 2024-04-01 | End: 2024-04-01

## 2024-04-01 RX ADMIN — Medication 10 MILLIGRAM(S): at 11:34

## 2024-04-01 RX ADMIN — Medication 975 MILLIGRAM(S): at 11:33

## 2024-04-01 NOTE — ED PROVIDER NOTE - DIFFERENTIAL DIAGNOSIS
Differential Diagnosis Benign positional vertigo, vestibular neuronitis  Ménière's disease, infection, Stroke, diabetes, hypothyroidism, arrhythmia, orthostatic hypotension

## 2024-04-01 NOTE — ED PROVIDER NOTE - EKG/XRAY ADDITIONAL INFORMATION
Sinus bradycardia with incomplete right bundle branch block lateral ST-T wave changes similar to previous.

## 2024-04-01 NOTE — ED PROVIDER NOTE - PATIENT PORTAL LINK FT
You can access the FollowMyHealth Patient Portal offered by [facility] by registering at the following website: [email or web address]. By joining [employer]’s FollowMyHealth portal, you will also be able to view your health information using other applications (apps) compatible with our system.

## 2024-04-01 NOTE — ED PROVIDER NOTE - CLINICAL SUMMARY MEDICAL DECISION MAKING FREE TEXT BOX
73-year male history of A-fib with cardioversion hypertension dyslipidemia presents for evaluation of dizziness.  Patient reports a worsening sensation that came on suddenly.  He has no weakness numbness tingling loss of hearing as well.  Of note patient was here in early March with similar symptoms with negative stroke workup.  Pt is awake alert, sitting up in bed smiling upon approach, neck supple, perrla eomi, cn 2-12 intact, motor 5/5 X4, sensation grossly intact. normal finger to nose, normal rapid alt mvt, normal gait.

## 2024-04-01 NOTE — ED PROVIDER NOTE - CARE PROVIDER_API CALL
[facility]  Otolaryngology  [address] [place] [id number]  Phone: [phone]  Fax: [phone]  Follow Up Time: Routine

## 2024-04-01 NOTE — ED ADULT TRIAGE NOTE - CHIEF COMPLAINT QUOTE
Pt c/o frequent episodes of dizziness. Pt states he was here last month for the same issue and was referred to ENT. Has f/u appt w them on 4/4 but pt states the dizziness was too severe so he had to come to ED. Denies chest pain. Occasionally SOB

## 2024-04-01 NOTE — ED PROVIDER NOTE - OBJECTIVE STATEMENT
Patient is a 73 year old male with PMHx of AFIB s/p ANTHONY cardioversion, HTN, HLD, and former smoker presenting for dizziness. Patient endorses Patient is a 73 year old male with PMHx of AFIB s/p ANTHONY cardioversion, HTN, HLD, and former smoker presenting for dizziness. Patient endorses an acute onset of vertigo ("felt like the room was spinning") this morning that resolved after several minutes. Patient states his symptoms today felt similar to a recent episode of vertigo. Patient denies ear fullness, congestion, headache, vision changes, chest pain, shortness of breath, nausea, vomiting, abdominal pain, or additional complaints.

## 2024-04-01 NOTE — ED PROVIDER NOTE - PHYSICAL EXAMINATION
VITAL SIGNS: I have reviewed nursing notes and confirm.  CONSTITUTIONAL: Well-appearing, non-toxic, in NAD  SKIN: Warm dry, normal skin turgor  HEAD: NCAT  EYES: No conjunctival injection, scleral anicteric  ENT: Moist mucous membranes, normal pharynx with no erythema or exudates  NECK: Supple; full ROM. Nontender. No cervical LAD  CARD: RRR, no murmurs, rubs or gallops  RESP: Clear to ausculation bilaterally.  No rales, rhonchi, or wheezing.  ABD: Soft, non-distended, non-tender, no rebound or guarding. No CVA tenderness  EXT: Full ROM, no bony tenderness, no pedal edema, no calf tenderness  NEURO: Normal motor, normal sensory. CN II-XII grossly intact. Cerebellar testing normal. Normal gait.  PSYCH: Cooperative, appropriate. VITAL SIGNS: I have reviewed nursing notes and confirm.  CONSTITUTIONAL: Well-appearing, non-toxic, in NAD  SKIN: Warm dry, normal skin turgor  HEAD: NCAT  EYES: EOMI, PERRLA, no nystagmus noted; No conjunctival injection, scleral anicteric  ENT: Moist mucous membranes, normal pharynx with no erythema or exudates  NECK: Supple; full ROM. Nontender. No cervical LAD  CARD: RRR, no murmurs, rubs or gallops  RESP: Clear to ausculation bilaterally.  No rales, rhonchi, or wheezing.  ABD: Soft, non-distended, non-tender, no rebound or guarding. No CVA tenderness  EXT: Full ROM, no bony tenderness, no pedal edema, no calf tenderness  NEURO: Normal motor, normal sensory. CN II-XII grossly intact. Cerebellar testing normal. Normal gait. NIHSS: 0  PSYCH: Cooperative, appropriate.

## 2024-04-04 ENCOUNTER — APPOINTMENT (OUTPATIENT)
Dept: OTOLARYNGOLOGY | Facility: CLINIC | Age: 74
End: 2024-04-04

## 2024-04-05 ENCOUNTER — APPOINTMENT (OUTPATIENT)
Dept: NEUROLOGY | Facility: CLINIC | Age: 74
End: 2024-04-05
Payer: MEDICARE

## 2024-04-05 DIAGNOSIS — I67.1 CEREBRAL ANEURYSM, NONRUPTURED: ICD-10-CM

## 2024-04-05 PROCEDURE — G2211 COMPLEX E/M VISIT ADD ON: CPT

## 2024-04-05 PROCEDURE — 99204 OFFICE O/P NEW MOD 45 MIN: CPT

## 2024-04-22 NOTE — ASSESSMENT
[FreeTextEntry1] : 72 y/o gentleman with incidental 2mm right KATHRYN aneurysm not related to his symptoms of dizziness which seem peripheral in etiology. No concerning findings on exam. After discussing the imaging findings and potential risks/benefits of treatment with the patient, we collectively agreed to conservatively manage the aneurysm with serial monitoring/imaging for now. If there are any interval changes in serial imaging or new symptoms, we will consider pursuing DSA at that time. Patient expressed understanding and agreement with the plan.  - Obtain interval MRA head in 6 months for interval assessment of aneurysm - Follow-up with ENT regarding neck mass (possible lipoma?) - RTC in 6 months s/p MRA

## 2024-04-22 NOTE — HISTORY OF PRESENT ILLNESS
[employer]    Physical Therapy Treatment Note/ Progress Report:     Date:  10/2/2020    Patient Name:  Umair Man    :  1953  MRN: [id number]  Medical/Treatment Diagnosis Information:  · Diagnosis: L shoulder pain M25.512  · Treatment Diagnosis: L shoulder pain  Insurance/Certification information:  PT Insurance Information: [employer], $150 deductible, no copay, no visit limit as medically necessary  Physician Information:  Referring Practitioner: Alyce De La Rosa MD  Plan of care signed (Y/N):     Date of Patient follow up with Physician:      Progress Report: []  Yes  [x]  No     Functional Scale:  10/2/2020 QuickDASH = 23% disability    Date Range for reporting period:  Beginning:  10/2/2020  Ending:      Progress report due (10 Rx/or 30 days whichever is less): 6703     Recertification due (POC duration/ or 90 days whichever is less): 2020     Visit # Insurance Allowable Auth Needed   1 Med nec []Yes    [x]No     Pain level:  5-6/10 reaching cross body or behind back     SUBJECTIVE:  See eval    OBJECTIVE: See eval   Observation:    Test measurements:      RESTRICTIONS/PRECAUTIONS: frozen shoulder, moderate irritability    Exercises/Interventions:   Therapeutic Ex Wt / Resistance Sets/sec Reps Notes   Wand ER, supine 2\" 1 12    Wand flexion 2\" 1 12    Sleeper stretch on wall  10\" 5x    Wall slides AAROM  2\" 12    Functional IR stretch, Hand assist  10\" 5x                                                                                                      Manual Intervention       Shld /GH Mobs  5 min  Gr III-IV   Post Cap mobs       Thoracic/Rib manipulation       CT MT/Mobs       PROM MT  5 min  All directions                 NMR re-education                                                                   Therapeutic Exercise and NMR EXR  [x] (93053) Provided verbal/tactile cueing for activities related to strengthening, flexibility, endurance, ROM  for improvements in scapular, scapulothoracic and UE control with self care, reaching, carrying, lifting, house/yardwork, driving/computer work.    [] (53919) Provided verbal/tactile cueing for activities related to improving balance, coordination, kinesthetic sense, posture, motor skill, proprioception  to assist with  scapular, scapulothoracic and UE control with self care, reaching, carrying, lifting, house/yardwork, driving/computer work. Therapeutic Activities:    [x] (62718 or 12972) Provided verbal/tactile cueing for activities related to improving balance, coordination, kinesthetic sense, posture, motor skill, proprioception and motor activation to allow for proper function of scapular, scapulothoracic and UE control with self care, carrying, lifting, driving/computer work.      Home Exercise Program:    [x] (37355) Reviewed/Progressed HEP activities related to strengthening, flexibility, endurance, ROM of scapular, scapulothoracic and UE control with self care, reaching, carrying, lifting, house/yardwork, driving/computer work  [] (22143) Reviewed/Progressed HEP activities related to improving balance, coordination, kinesthetic sense, posture, motor skill, proprioception of scapular, scapulothoracic and UE control with self care, reaching, carrying, lifting, house/yardwork, driving/computer work      Manual Treatments:  PROM / STM / Oscillations-Mobs:  G-I, II, III, IV (PA's, Inf., Post.)  [x] (14990) Provided manual therapy to mobilize soft tissue/joints of cervical/CT, scapular GHJ and UE for the purpose of modulating pain, promoting relaxation,  increasing ROM, reducing/eliminating soft tissue swelling/inflammation/restriction, improving soft tissue extensibility and allowing for proper ROM for normal function with self care, reaching, carrying, lifting, house/yardwork, driving/computer work    Modalities:      Charges:  Timed Code Treatment Minutes: eval+23   Total Treatment Minutes: 55       [x] EVAL (LOW) 02834 (typically 20 minutes face-to-face)  [] EVAL (MOD) 41361 (typically 30 minutes face-to-face)  [] EVAL (HIGH) 59438 (typically 45 minutes face-to-face)  [] RE-EVAL     [x] WP(42398) x  1   [] IONTO (27941)  [] NMR (85226) x     [] VASO (49195)  [x] Manual (07590) x 1    [] Other:  [] TA (63517)x     [] Mech Traction (91045)  [] ES(attended) (29166)     [] ES (un) (86068):     GOALS:  Patient stated goal: reach behind body without pain  []? Progressing: []? Met: []? Not Met: []? Adjusted     Therapist goals for Patient:   Short Term Goals: To be achieved in: 2 weeks  1. Independent in HEP and progression per patient tolerance, in order to prevent re-injury. []? Progressing: []? Met: []? Not Met: []? Adjusted  2. Patient will have a decrease in pain to facilitate improvement in movement, function, and ADLs as indicated by Functional Deficits. []? Progressing: []? Met: []? Not Met: []? Adjusted     Long Term Goals: To be achieved in: 6-8 weeks  1. Disability index score of 10% or less for the DASH to assist with reaching prior level of function. []? Progressing: []? Met: []? Not Met: []? Adjusted  2. Patient will demonstrate increased AROM to [facility] to allow for proper joint functioning as indicated by patients Functional Deficits. []? Progressing: []? Met: []? Not Met: []? Adjusted  3. Patient will demonstrate an increase in Strength to within 5lbs RUE to allow for proper functional mobility as indicated by patients Functional Deficits. []? Progressing: []? Met: []? Not Met: []? Adjusted  4. Patient will return to reaching cross body activities without increased symptoms or restriction. []? Progressing: []? Met: []? Not Met: []? Adjusted  5. Pt will sleep undisturbed by pain    []? Progressing: []? Met: []? Not Met: []? Adjusted         Progression Towards Functional goals:  [] Patient is progressing as expected towards functional goals listed.     [] Progression [FreeTextEntry1] : Mr Byers is a 74 y/o gentleman with hx of HTN, HLD, Afib s/p ablation on Eliquis, who was evaluated in the [facility] ED in 03/2024 for dizziness x 1 month. 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 MRI brain was negative for acute infarction. He was subsequently referred to the neuroendovascular clinic for further evaluation.  Patient follows with ENT (Dr Garay) for his dizziness. He reports that after having some cerumen removed at his last ENT visit, he had some improvement in the dizziness. He otherwise has no new complaints today.    is slowed due to complexities listed. [] Progression has been slowed due to co-morbidities. [x] Plan just implemented, too soon to assess goals progression  [] Other:     ASSESSMENT:  See eval     Treatment/Activity Tolerance:  [x] Patient tolerated treatment well [] Patient limited by fatique  [] Patient limited by pain  [] Patient limited by other medical complications  [] Other:     Overall Progression Towards Functional goals/ Treatment Progress Update:  [] Patient is progressing as expected towards functional goals listed. [] Progression is slowed due to complexities/Impairments listed. [] Progression has been slowed due to co-morbidities. [x] Plan just implemented, too soon to assess goals progression <30days   [] Goals require adjustment due to lack of progress  [] Patient is not progressing as expected and requires additional follow up with physician  [] Other    Prognosis for POC: [x] Good [] Fair  [] Poor    Patient requires continued skilled intervention: [x] Yes  [] No      PLAN: See eval  [] Continue per plan of care [] Alter current plan (see comments)  [x] Plan of care initiated [] Hold pending MD visit [] Discharge    Electronically signed by: Rivka Harley PT     Note: If patient does not return for scheduled/recommended follow up visits, this note will serve as a discharge from care along with the most recent update on progress.

## 2024-04-22 NOTE — PHYSICAL EXAM
[FreeTextEntry1] : AAOx3. NAD CN2-12 normal Soft neck mass, non-tender No aphasia, dysarthria or dysphagia Strength 5/5 throughout Normal sensation Normal bulk/tone Narrow based gait

## 2024-04-23 NOTE — ED CDU PROVIDER INITIAL DAY NOTE - ASSESSMENT PLAN
MRI How Severe Are Your Spot(S)?: moderate What Type Of Note Output Would You Prefer (Optional)?: Standard Output What Is The Reason For Today's Visit?: Full Body Skin Examination What Is The Reason For Today's Visit? (Being Monitored For X): concerning skin lesions on an annual basis

## 2024-06-20 ENCOUNTER — APPOINTMENT (OUTPATIENT)
Dept: SURGERY | Facility: CLINIC | Age: 74
End: 2024-06-20
Payer: MEDICARE

## 2024-06-20 VITALS
DIASTOLIC BLOOD PRESSURE: 88 MMHG | OXYGEN SATURATION: 97 % | TEMPERATURE: 96.8 F | WEIGHT: 192 LBS | HEIGHT: 62.5 IN | SYSTOLIC BLOOD PRESSURE: 142 MMHG | HEART RATE: 89 BPM | BODY MASS INDEX: 34.45 KG/M2

## 2024-06-20 DIAGNOSIS — R22.1 LOCALIZED SWELLING, MASS AND LUMP, NECK: ICD-10-CM

## 2024-06-20 PROCEDURE — 99203 OFFICE O/P NEW LOW 30 MIN: CPT

## 2024-07-02 PROBLEM — R22.1 NECK MASS: Status: ACTIVE | Noted: 2024-03-07

## 2024-07-15 ENCOUNTER — RESULT CHARGE (OUTPATIENT)
Age: 74
End: 2024-07-15

## 2024-07-15 ENCOUNTER — APPOINTMENT (OUTPATIENT)
Dept: CARDIOLOGY | Facility: CLINIC | Age: 74
End: 2024-07-15
Payer: MEDICARE

## 2024-07-15 VITALS
HEART RATE: 58 BPM | SYSTOLIC BLOOD PRESSURE: 174 MMHG | WEIGHT: 191 LBS | OXYGEN SATURATION: 98 % | HEIGHT: 63 IN | DIASTOLIC BLOOD PRESSURE: 98 MMHG | BODY MASS INDEX: 33.84 KG/M2

## 2024-07-15 VITALS — SYSTOLIC BLOOD PRESSURE: 168 MMHG | DIASTOLIC BLOOD PRESSURE: 94 MMHG

## 2024-07-15 DIAGNOSIS — R22.1 LOCALIZED SWELLING, MASS AND LUMP, NECK: ICD-10-CM

## 2024-07-15 DIAGNOSIS — I48.92 UNSPECIFIED ATRIAL FLUTTER: ICD-10-CM

## 2024-07-15 DIAGNOSIS — R42 DIZZINESS AND GIDDINESS: ICD-10-CM

## 2024-07-15 DIAGNOSIS — I10 ESSENTIAL (PRIMARY) HYPERTENSION: ICD-10-CM

## 2024-07-15 DIAGNOSIS — I42.0 DILATED CARDIOMYOPATHY: ICD-10-CM

## 2024-07-15 PROCEDURE — G2211 COMPLEX E/M VISIT ADD ON: CPT

## 2024-07-15 PROCEDURE — 93000 ELECTROCARDIOGRAM COMPLETE: CPT

## 2024-07-15 PROCEDURE — 99214 OFFICE O/P EST MOD 30 MIN: CPT

## 2024-07-15 RX ORDER — METOPROLOL SUCCINATE 50 MG/1
50 TABLET, EXTENDED RELEASE ORAL DAILY
Qty: 10 | Refills: 0 | Status: ACTIVE | COMMUNITY
Start: 2024-07-15

## 2024-08-07 ENCOUNTER — OUTPATIENT (OUTPATIENT)
Dept: OUTPATIENT SERVICES | Facility: HOSPITAL | Age: 74
LOS: 1 days | End: 2024-08-07
Payer: MEDICARE

## 2024-08-07 VITALS
HEART RATE: 62 BPM | RESPIRATION RATE: 18 BRPM | OXYGEN SATURATION: 96 % | TEMPERATURE: 98 F | SYSTOLIC BLOOD PRESSURE: 164 MMHG | WEIGHT: 195.11 LBS | DIASTOLIC BLOOD PRESSURE: 95 MMHG

## 2024-08-07 DIAGNOSIS — Z87.891 PERSONAL HISTORY OF NICOTINE DEPENDENCE: Chronic | ICD-10-CM

## 2024-08-07 DIAGNOSIS — Z98.890 OTHER SPECIFIED POSTPROCEDURAL STATES: Chronic | ICD-10-CM

## 2024-08-07 DIAGNOSIS — Z92.89 PERSONAL HISTORY OF OTHER MEDICAL TREATMENT: Chronic | ICD-10-CM

## 2024-08-07 DIAGNOSIS — G47.33 OBSTRUCTIVE SLEEP APNEA (ADULT) (PEDIATRIC): Chronic | ICD-10-CM

## 2024-08-07 DIAGNOSIS — Z86.79 PERSONAL HISTORY OF OTHER DISEASES OF THE CIRCULATORY SYSTEM: Chronic | ICD-10-CM

## 2024-08-07 DIAGNOSIS — Z01.818 ENCOUNTER FOR OTHER PREPROCEDURAL EXAMINATION: ICD-10-CM

## 2024-08-07 DIAGNOSIS — H91.90 UNSPECIFIED HEARING LOSS, UNSPECIFIED EAR: Chronic | ICD-10-CM

## 2024-08-07 DIAGNOSIS — R22.1 LOCALIZED SWELLING, MASS AND LUMP, NECK: ICD-10-CM

## 2024-08-07 LAB
ALBUMIN SERPL ELPH-MCNC: 4.5 G/DL — SIGNIFICANT CHANGE UP (ref 3.5–5.2)
ALP SERPL-CCNC: 76 U/L — SIGNIFICANT CHANGE UP (ref 30–115)
ALT FLD-CCNC: 22 U/L — SIGNIFICANT CHANGE UP (ref 0–41)
ANION GAP SERPL CALC-SCNC: 15 MMOL/L — HIGH (ref 7–14)
APTT BLD: 39.1 SEC — SIGNIFICANT CHANGE UP (ref 27–39.2)
AST SERPL-CCNC: 19 U/L — SIGNIFICANT CHANGE UP (ref 0–41)
BASOPHILS # BLD AUTO: 0.04 K/UL — SIGNIFICANT CHANGE UP (ref 0–0.2)
BASOPHILS NFR BLD AUTO: 0.5 % — SIGNIFICANT CHANGE UP (ref 0–1)
BILIRUB SERPL-MCNC: 0.4 MG/DL — SIGNIFICANT CHANGE UP (ref 0.2–1.2)
BUN SERPL-MCNC: 24 MG/DL — HIGH (ref 10–20)
CALCIUM SERPL-MCNC: 9.3 MG/DL — SIGNIFICANT CHANGE UP (ref 8.4–10.5)
CHLORIDE SERPL-SCNC: 98 MMOL/L — SIGNIFICANT CHANGE UP (ref 98–110)
CO2 SERPL-SCNC: 27 MMOL/L — SIGNIFICANT CHANGE UP (ref 17–32)
CREAT SERPL-MCNC: 0.9 MG/DL — SIGNIFICANT CHANGE UP (ref 0.7–1.5)
EGFR: 89 ML/MIN/1.73M2 — SIGNIFICANT CHANGE UP
EOSINOPHIL # BLD AUTO: 0.13 K/UL — SIGNIFICANT CHANGE UP (ref 0–0.7)
EOSINOPHIL NFR BLD AUTO: 1.7 % — SIGNIFICANT CHANGE UP (ref 0–8)
GLUCOSE SERPL-MCNC: 104 MG/DL — HIGH (ref 70–99)
HCT VFR BLD CALC: 52.2 % — HIGH (ref 42–52)
HGB BLD-MCNC: 18 G/DL — SIGNIFICANT CHANGE UP (ref 14–18)
IMM GRANULOCYTES NFR BLD AUTO: 0.4 % — HIGH (ref 0.1–0.3)
INR BLD: 1.07 RATIO — SIGNIFICANT CHANGE UP (ref 0.65–1.3)
LYMPHOCYTES # BLD AUTO: 2.37 K/UL — SIGNIFICANT CHANGE UP (ref 1.2–3.4)
LYMPHOCYTES # BLD AUTO: 31.7 % — SIGNIFICANT CHANGE UP (ref 20.5–51.1)
MCHC RBC-ENTMCNC: 29 PG — SIGNIFICANT CHANGE UP (ref 27–31)
MCHC RBC-ENTMCNC: 34.5 G/DL — SIGNIFICANT CHANGE UP (ref 32–37)
MCV RBC AUTO: 84.2 FL — SIGNIFICANT CHANGE UP (ref 80–94)
MONOCYTES # BLD AUTO: 0.54 K/UL — SIGNIFICANT CHANGE UP (ref 0.1–0.6)
MONOCYTES NFR BLD AUTO: 7.2 % — SIGNIFICANT CHANGE UP (ref 1.7–9.3)
NEUTROPHILS # BLD AUTO: 4.36 K/UL — SIGNIFICANT CHANGE UP (ref 1.4–6.5)
NEUTROPHILS NFR BLD AUTO: 58.5 % — SIGNIFICANT CHANGE UP (ref 42.2–75.2)
NRBC # BLD: 0 /100 WBCS — SIGNIFICANT CHANGE UP (ref 0–0)
PLATELET # BLD AUTO: 178 K/UL — SIGNIFICANT CHANGE UP (ref 130–400)
PMV BLD: 10.9 FL — HIGH (ref 7.4–10.4)
POTASSIUM SERPL-MCNC: 3.8 MMOL/L — SIGNIFICANT CHANGE UP (ref 3.5–5)
POTASSIUM SERPL-SCNC: 3.8 MMOL/L — SIGNIFICANT CHANGE UP (ref 3.5–5)
PROT SERPL-MCNC: 7.6 G/DL — SIGNIFICANT CHANGE UP (ref 6–8)
PROTHROM AB SERPL-ACNC: 12.2 SEC — SIGNIFICANT CHANGE UP (ref 9.95–12.87)
RBC # BLD: 6.2 M/UL — HIGH (ref 4.7–6.1)
RBC # FLD: 12.6 % — SIGNIFICANT CHANGE UP (ref 11.5–14.5)
SODIUM SERPL-SCNC: 140 MMOL/L — SIGNIFICANT CHANGE UP (ref 135–146)
WBC # BLD: 7.47 K/UL — SIGNIFICANT CHANGE UP (ref 4.8–10.8)
WBC # FLD AUTO: 7.47 K/UL — SIGNIFICANT CHANGE UP (ref 4.8–10.8)

## 2024-08-07 PROCEDURE — 85730 THROMBOPLASTIN TIME PARTIAL: CPT

## 2024-08-07 PROCEDURE — 85610 PROTHROMBIN TIME: CPT

## 2024-08-07 PROCEDURE — 36415 COLL VENOUS BLD VENIPUNCTURE: CPT

## 2024-08-07 PROCEDURE — 80053 COMPREHEN METABOLIC PANEL: CPT

## 2024-08-07 PROCEDURE — 85025 COMPLETE CBC W/AUTO DIFF WBC: CPT

## 2024-08-07 PROCEDURE — 99214 OFFICE O/P EST MOD 30 MIN: CPT | Mod: 25

## 2024-08-07 NOTE — H&P PST ADULT - NSICDXPASTSURGICALHX_GEN_ALL_CORE_FT
PAST SURGICAL HISTORY:  Former smoker     H/O cardiomyopathy     H/O right knee surgery     Hearing loss     History of cardioversion     JESUS (obstructive sleep apnea)

## 2024-08-07 NOTE — H&P PST ADULT - NSANTHOSAYNRD_GEN_A_CORE
FYI.     Patient is schedule for this Friday for re-evaluation.   
Patient called stating that he needs another return to work letter faxed to employer. Pt stated that he can return to work on 05.26.20, starting with half days for now, per Physical Therapy. Patient needs to know if provider has any other restrictions that he should follow at work when he returns. PT will advise patient when he can go back to work full days. Please advise on new letter?  
Will need to reevaluate  Exam  Set up on Friday check up  
Yes

## 2024-08-07 NOTE — H&P PST ADULT - HISTORY OF PRESENT ILLNESS
Patient is a 73 year old  male presenting to PAST in preparation for excision of mass on neck on  8- under  [facility] anesthesia by Dr. Mickey Guardado .    Patient states he has lipoma on the back of his neck for the past 2 years.  Denies pain.  Reports it increased in size.  Seeking surgical removal.    PATIENT CURRENTLY DENIES CHEST PAIN  SHORTNESS OF BREATH  PALPITATIONS,  DYSURIA, OR UPPER RESPIRATORY INFECTION IN PAST 2 WEEKS    denies travel outside the [place] in the past 30 days     Anesthesia Alert  YES--Difficult Airway CLASS 4 AIRWAY  NO--History of neck surgery or radiation  NO--Limited ROM of neck  NO--History of Malignant hyperthermia  NO--No personal or family history of Pseudocholinesterase deficiency.  NO--Prior Anesthesia Complication  NO--Latex Allergy  NO--Loose teeth  NO--History of Rheumatoid Arthritis  YES--Bleeding risk- ON ELIQUIS  YES--JESUS  NO--Other_____    PT  DENIES ANY RASHES, ABRASION, OR OPEN WOUNDS OR CUTS    AS PER THE PATIENT, THIS IS HIS/HER COMPLETE MEDICAL AND SURGICAL HX, INCLUDING MEDICATIONS PRESCRIBED AND OVER THE COUNTER    Patient  communicated understanding of instructions and was given the opportunity to ask questions and have them answered.    pt denies any suicidal ideation or thoughts, pt states not a threat to self or others      Revised Cardiac Risk Index for Pre-Operative Risk from [email or web address]  on 8/7/2024  ** All calculations should be rechecked by clinician prior to use **    RESULT SUMMARY:  0 points  Class I Risk    3.9 %  30-day risk of death, MI, or cardiac arrest<br><br>From [place] 2017. These numbers are higher than those from the original study (Sunday 1999). See Evidence for details.      INPUTS:  Elevated-risk surgery —> 0 = No  History of ischemic heart disease —> 0 = No  History of congestive heart failure —> 0 = No  History of cerebrovascular disease —> 0 = No  Pre-operative treatment with insulin —> 0 = No  Pre-operative creatinine >2 mg/dL / 176.8 µmol/L —> 0 = No    Duke Activity Status Index (DASI) from [email or web address]  on 8/7/2024  ** All calculations should be rechecked by clinician prior to use **    RESULT SUMMARY:  50.7 points  The higher the score (maximum 58.2), the higher the functional status.    8.97 METs        INPUTS:  Take care of self —> 2.75 = Yes  Walk indoors —> 1.75 = Yes  Walk 1&ndash;2 blocks on level ground —> 2.75 = Yes  Climb a flight of stairs or walk up a hill —> 5.5 = Yes  Run a short distance —> 8 = Yes  Do light work around the house —> 2.7 = Yes  Do moderate work around the house —> 3.5 = Yes  Do heavy work around the house —> 8 = Yes  Do yardwork —> 4.5 = Yes  Have sexual relations —> 5.25 = Yes  Participate in moderate recreational activities —> 6 = Yes  Participate in strenuous sports —> 0 = No

## 2024-08-07 NOTE — H&P PST ADULT - NSICDXPASTMEDICALHX_GEN_ALL_CORE_FT
PAST MEDICAL HISTORY:  Edema of both ankles     H/O emphysema     High cholesterol     History of atrial flutter     HTN (hypertension)     Obese     Skin cyst     SOB (shortness of breath)

## 2024-08-08 DIAGNOSIS — Z01.818 ENCOUNTER FOR OTHER PREPROCEDURAL EXAMINATION: ICD-10-CM

## 2024-08-08 DIAGNOSIS — R22.1 LOCALIZED SWELLING, MASS AND LUMP, NECK: ICD-10-CM

## 2024-08-21 ENCOUNTER — TRANSCRIPTION ENCOUNTER (OUTPATIENT)
Age: 74
End: 2024-08-21

## 2024-08-21 ENCOUNTER — APPOINTMENT (OUTPATIENT)
Dept: SURGERY | Facility: HOSPITAL | Age: 74
End: 2024-08-21

## 2024-08-21 RX ORDER — METOPROLOL TARTRATE 100 MG
1 TABLET ORAL
Refills: 0 | DISCHARGE

## 2024-08-29 ENCOUNTER — NON-APPOINTMENT (OUTPATIENT)
Age: 74
End: 2024-08-29

## 2024-08-30 ENCOUNTER — APPOINTMENT (OUTPATIENT)
Dept: SURGERY | Facility: CLINIC | Age: 74
End: 2024-08-30

## 2024-08-30 PROBLEM — Z86.79 PERSONAL HISTORY OF OTHER DISEASES OF THE CIRCULATORY SYSTEM: Chronic | Status: ACTIVE | Noted: 2024-08-07

## 2024-08-30 PROBLEM — J43.9 EMPHYSEMA, UNSPECIFIED: Chronic | Status: ACTIVE | Noted: 2024-08-07

## 2024-08-30 PROCEDURE — 99024 POSTOP FOLLOW-UP VISIT: CPT

## 2024-08-30 RX ORDER — METHOCARBAMOL 500 MG/1
500 TABLET, FILM COATED ORAL
Qty: 10 | Refills: 0 | Status: ACTIVE | COMMUNITY
Start: 2024-08-30 | End: 1900-01-01

## 2024-09-05 ENCOUNTER — APPOINTMENT (OUTPATIENT)
Dept: SURGERY | Facility: CLINIC | Age: 74
End: 2024-09-05

## 2024-09-05 VITALS
TEMPERATURE: 97 F | BODY MASS INDEX: 33.84 KG/M2 | HEART RATE: 80 BPM | DIASTOLIC BLOOD PRESSURE: 88 MMHG | SYSTOLIC BLOOD PRESSURE: 150 MMHG | OXYGEN SATURATION: 98 % | WEIGHT: 191 LBS | HEIGHT: 63 IN

## 2024-09-05 DIAGNOSIS — R22.1 LOCALIZED SWELLING, MASS AND LUMP, NECK: ICD-10-CM

## 2024-09-05 PROCEDURE — 99024 POSTOP FOLLOW-UP VISIT: CPT

## 2024-09-13 NOTE — ASSESSMENT
[FreeTextEntry1] :  73 year old male doing well after mass excision; pathology report discussed with the patient. Questions answered   - RTC PRN

## 2024-09-13 NOTE — HISTORY OF PRESENT ILLNESS
[de-identified] : 73-year-old male presents for acute follow-up after excision of neck mass   Prior history: Patient states he has a spasm pain on the side of the neck opposite his incision.  States he had such difficulty moving his neck that his wife had to help him elevate his neck to get out of bed.  States he notices some tingling on the top of his scalp and is concerned.  States today the pain is much better than yesterday.  Today:  Pain is close to resolved. Doing well.  Denies nausea, vomiting, fevers, or chills

## 2024-09-13 NOTE — PHYSICAL EXAM
[de-identified] : NAD [de-identified] : Incision clean, dry, and intact; underlying inflammation improved

## 2024-10-04 ENCOUNTER — APPOINTMENT (OUTPATIENT)
Dept: NEUROLOGY | Facility: CLINIC | Age: 74
End: 2024-10-04

## 2025-02-24 ENCOUNTER — RESULT CHARGE (OUTPATIENT)
Age: 75
End: 2025-02-24

## 2025-02-24 ENCOUNTER — NON-APPOINTMENT (OUTPATIENT)
Age: 75
End: 2025-02-24

## 2025-02-24 ENCOUNTER — APPOINTMENT (OUTPATIENT)
Dept: CARDIOLOGY | Facility: CLINIC | Age: 75
End: 2025-02-24
Payer: MEDICARE

## 2025-02-24 VITALS
HEIGHT: 63 IN | BODY MASS INDEX: 34.02 KG/M2 | WEIGHT: 192 LBS | SYSTOLIC BLOOD PRESSURE: 160 MMHG | DIASTOLIC BLOOD PRESSURE: 90 MMHG | HEART RATE: 73 BPM

## 2025-02-24 DIAGNOSIS — I10 ESSENTIAL (PRIMARY) HYPERTENSION: ICD-10-CM

## 2025-02-24 DIAGNOSIS — R42 DIZZINESS AND GIDDINESS: ICD-10-CM

## 2025-02-24 DIAGNOSIS — I42.0 DILATED CARDIOMYOPATHY: ICD-10-CM

## 2025-02-24 DIAGNOSIS — I67.1 CEREBRAL ANEURYSM, NONRUPTURED: ICD-10-CM

## 2025-02-24 DIAGNOSIS — I48.92 UNSPECIFIED ATRIAL FLUTTER: ICD-10-CM

## 2025-02-24 DIAGNOSIS — G47.33 OBSTRUCTIVE SLEEP APNEA (ADULT) (PEDIATRIC): ICD-10-CM

## 2025-02-24 DIAGNOSIS — H90.11 CONDUCTIVE HEARING LOSS, UNILATERAL, RIGHT EAR, WITH UNRESTRICTED HEARING ON THE CONTRALATERAL SIDE: ICD-10-CM

## 2025-02-24 PROCEDURE — 93000 ELECTROCARDIOGRAM COMPLETE: CPT

## 2025-02-24 PROCEDURE — G2211 COMPLEX E/M VISIT ADD ON: CPT

## 2025-02-24 PROCEDURE — 99214 OFFICE O/P EST MOD 30 MIN: CPT

## 2025-03-23 ENCOUNTER — EMERGENCY (EMERGENCY)
Facility: HOSPITAL | Age: 75
LOS: 0 days | Discharge: HOME | End: 2025-03-23
Attending: STUDENT IN AN ORGANIZED HEALTH CARE EDUCATION/TRAINING PROGRAM
Payer: MEDICARE

## 2025-03-23 VITALS
DIASTOLIC BLOOD PRESSURE: 85 MMHG | TEMPERATURE: 98 F | OXYGEN SATURATION: 98 % | WEIGHT: 199.96 LBS | SYSTOLIC BLOOD PRESSURE: 169 MMHG | RESPIRATION RATE: 18 BRPM | HEART RATE: 86 BPM

## 2025-03-23 DIAGNOSIS — R06.02 SHORTNESS OF BREATH: ICD-10-CM

## 2025-03-23 DIAGNOSIS — H91.90 UNSPECIFIED HEARING LOSS, UNSPECIFIED EAR: Chronic | ICD-10-CM

## 2025-03-23 DIAGNOSIS — R53.1 WEAKNESS: ICD-10-CM

## 2025-03-23 DIAGNOSIS — Z92.89 PERSONAL HISTORY OF OTHER MEDICAL TREATMENT: Chronic | ICD-10-CM

## 2025-03-23 DIAGNOSIS — Z87.891 PERSONAL HISTORY OF NICOTINE DEPENDENCE: ICD-10-CM

## 2025-03-23 DIAGNOSIS — Z86.79 PERSONAL HISTORY OF OTHER DISEASES OF THE CIRCULATORY SYSTEM: Chronic | ICD-10-CM

## 2025-03-23 DIAGNOSIS — G47.33 OBSTRUCTIVE SLEEP APNEA (ADULT) (PEDIATRIC): Chronic | ICD-10-CM

## 2025-03-23 DIAGNOSIS — R60.0 LOCALIZED EDEMA: ICD-10-CM

## 2025-03-23 DIAGNOSIS — Z88.0 ALLERGY STATUS TO PENICILLIN: ICD-10-CM

## 2025-03-23 DIAGNOSIS — Z87.891 PERSONAL HISTORY OF NICOTINE DEPENDENCE: Chronic | ICD-10-CM

## 2025-03-23 DIAGNOSIS — I48.91 UNSPECIFIED ATRIAL FIBRILLATION: ICD-10-CM

## 2025-03-23 DIAGNOSIS — Z98.890 OTHER SPECIFIED POSTPROCEDURAL STATES: Chronic | ICD-10-CM

## 2025-03-23 DIAGNOSIS — I45.10 UNSPECIFIED RIGHT BUNDLE-BRANCH BLOCK: ICD-10-CM

## 2025-03-23 DIAGNOSIS — Z88.8 ALLERGY STATUS TO OTHER DRUGS, MEDICAMENTS AND BIOLOGICAL SUBSTANCES: ICD-10-CM

## 2025-03-23 DIAGNOSIS — J44.1 CHRONIC OBSTRUCTIVE PULMONARY DISEASE WITH (ACUTE) EXACERBATION: ICD-10-CM

## 2025-03-23 LAB
ALBUMIN SERPL ELPH-MCNC: 4.2 G/DL — SIGNIFICANT CHANGE UP (ref 3.5–5.2)
ALP SERPL-CCNC: 69 U/L — SIGNIFICANT CHANGE UP (ref 30–115)
ALT FLD-CCNC: 17 U/L — SIGNIFICANT CHANGE UP (ref 0–41)
ANION GAP SERPL CALC-SCNC: 16 MMOL/L — HIGH (ref 7–14)
APTT BLD: 48.4 SEC — HIGH (ref 27–39.2)
AST SERPL-CCNC: 21 U/L — SIGNIFICANT CHANGE UP (ref 0–41)
BASOPHILS # BLD AUTO: 0.03 K/UL — SIGNIFICANT CHANGE UP (ref 0–0.2)
BASOPHILS NFR BLD AUTO: 0.3 % — SIGNIFICANT CHANGE UP (ref 0–1)
BILIRUB SERPL-MCNC: 0.3 MG/DL — SIGNIFICANT CHANGE UP (ref 0.2–1.2)
BUN SERPL-MCNC: 21 MG/DL — HIGH (ref 10–20)
CALCIUM SERPL-MCNC: 9.1 MG/DL — SIGNIFICANT CHANGE UP (ref 8.4–10.5)
CHLORIDE SERPL-SCNC: 96 MMOL/L — LOW (ref 98–110)
CO2 SERPL-SCNC: 27 MMOL/L — SIGNIFICANT CHANGE UP (ref 17–32)
CREAT SERPL-MCNC: 1.1 MG/DL — SIGNIFICANT CHANGE UP (ref 0.7–1.5)
EGFR: 70 ML/MIN/1.73M2 — SIGNIFICANT CHANGE UP
EGFR: 70 ML/MIN/1.73M2 — SIGNIFICANT CHANGE UP
EOSINOPHIL # BLD AUTO: 0.07 K/UL — SIGNIFICANT CHANGE UP (ref 0–0.7)
EOSINOPHIL NFR BLD AUTO: 0.6 % — SIGNIFICANT CHANGE UP (ref 0–8)
GLUCOSE SERPL-MCNC: 111 MG/DL — HIGH (ref 70–99)
HCT VFR BLD CALC: 52.6 % — HIGH (ref 42–52)
HGB BLD-MCNC: 18.2 G/DL — HIGH (ref 14–18)
IMM GRANULOCYTES NFR BLD AUTO: 0.3 % — SIGNIFICANT CHANGE UP (ref 0.1–0.3)
INR BLD: 1.24 RATIO — SIGNIFICANT CHANGE UP (ref 0.65–1.3)
LYMPHOCYTES # BLD AUTO: 1.73 K/UL — SIGNIFICANT CHANGE UP (ref 1.2–3.4)
LYMPHOCYTES # BLD AUTO: 15.6 % — LOW (ref 20.5–51.1)
MCHC RBC-ENTMCNC: 29.1 PG — SIGNIFICANT CHANGE UP (ref 27–31)
MCHC RBC-ENTMCNC: 34.6 G/DL — SIGNIFICANT CHANGE UP (ref 32–37)
MCV RBC AUTO: 84.2 FL — SIGNIFICANT CHANGE UP (ref 80–94)
MONOCYTES # BLD AUTO: 0.72 K/UL — HIGH (ref 0.1–0.6)
MONOCYTES NFR BLD AUTO: 6.5 % — SIGNIFICANT CHANGE UP (ref 1.7–9.3)
NEUTROPHILS # BLD AUTO: 8.49 K/UL — HIGH (ref 1.4–6.5)
NEUTROPHILS NFR BLD AUTO: 76.7 % — HIGH (ref 42.2–75.2)
NRBC BLD AUTO-RTO: 0 /100 WBCS — SIGNIFICANT CHANGE UP (ref 0–0)
NT-PROBNP SERPL-SCNC: 318 PG/ML — HIGH (ref 0–300)
PLATELET # BLD AUTO: 183 K/UL — SIGNIFICANT CHANGE UP (ref 130–400)
PMV BLD: 9.9 FL — SIGNIFICANT CHANGE UP (ref 7.4–10.4)
POTASSIUM SERPL-MCNC: 3.9 MMOL/L — SIGNIFICANT CHANGE UP (ref 3.5–5)
POTASSIUM SERPL-SCNC: 3.9 MMOL/L — SIGNIFICANT CHANGE UP (ref 3.5–5)
PROT SERPL-MCNC: 7.1 G/DL — SIGNIFICANT CHANGE UP (ref 6–8)
PROTHROM AB SERPL-ACNC: 14.7 SEC — HIGH (ref 9.95–12.87)
RBC # BLD: 6.25 M/UL — HIGH (ref 4.7–6.1)
RBC # FLD: 12.8 % — SIGNIFICANT CHANGE UP (ref 11.5–14.5)
SODIUM SERPL-SCNC: 139 MMOL/L — SIGNIFICANT CHANGE UP (ref 135–146)
TROPONIN T, HIGH SENSITIVITY RESULT: 22 NG/L — HIGH (ref 6–21)
TROPONIN T, HIGH SENSITIVITY RESULT: 31 NG/L — HIGH (ref 6–21)
WBC # BLD: 11.07 K/UL — HIGH (ref 4.8–10.8)
WBC # FLD AUTO: 11.07 K/UL — HIGH (ref 4.8–10.8)

## 2025-03-23 PROCEDURE — 99285 EMERGENCY DEPT VISIT HI MDM: CPT | Mod: FS

## 2025-03-23 PROCEDURE — 93005 ELECTROCARDIOGRAM TRACING: CPT

## 2025-03-23 PROCEDURE — 71045 X-RAY EXAM CHEST 1 VIEW: CPT

## 2025-03-23 PROCEDURE — 94640 AIRWAY INHALATION TREATMENT: CPT

## 2025-03-23 PROCEDURE — 96374 THER/PROPH/DIAG INJ IV PUSH: CPT

## 2025-03-23 PROCEDURE — 83880 ASSAY OF NATRIURETIC PEPTIDE: CPT

## 2025-03-23 PROCEDURE — 80053 COMPREHEN METABOLIC PANEL: CPT

## 2025-03-23 PROCEDURE — 71045 X-RAY EXAM CHEST 1 VIEW: CPT | Mod: 26

## 2025-03-23 PROCEDURE — 85025 COMPLETE CBC W/AUTO DIFF WBC: CPT

## 2025-03-23 PROCEDURE — 85730 THROMBOPLASTIN TIME PARTIAL: CPT

## 2025-03-23 PROCEDURE — 36415 COLL VENOUS BLD VENIPUNCTURE: CPT

## 2025-03-23 PROCEDURE — 84484 ASSAY OF TROPONIN QUANT: CPT | Mod: 91

## 2025-03-23 PROCEDURE — 85610 PROTHROMBIN TIME: CPT

## 2025-03-23 PROCEDURE — 93010 ELECTROCARDIOGRAM REPORT: CPT

## 2025-03-23 PROCEDURE — 99285 EMERGENCY DEPT VISIT HI MDM: CPT | Mod: 25

## 2025-03-23 RX ORDER — IPRATROPIUM BROMIDE AND ALBUTEROL SULFATE .5; 2.5 MG/3ML; MG/3ML
3 SOLUTION RESPIRATORY (INHALATION) ONCE
Refills: 0 | Status: COMPLETED | OUTPATIENT
Start: 2025-03-23 | End: 2025-03-23

## 2025-03-23 RX ORDER — HYDROCORTISONE 20 MG
125 TABLET ORAL EVERY 8 HOURS
Refills: 0 | Status: DISCONTINUED | OUTPATIENT
Start: 2025-03-23 | End: 2025-03-23

## 2025-03-23 RX ORDER — PREDNISONE 20 MG/1
1 TABLET ORAL
Qty: 5 | Refills: 0
Start: 2025-03-23 | End: 2025-03-27

## 2025-03-23 RX ORDER — IPRATROPIUM BROMIDE AND ALBUTEROL SULFATE .5; 2.5 MG/3ML; MG/3ML
3 SOLUTION RESPIRATORY (INHALATION)
Qty: 28 | Refills: 0
Start: 2025-03-23 | End: 2025-03-29

## 2025-03-23 RX ADMIN — IPRATROPIUM BROMIDE AND ALBUTEROL SULFATE 3 MILLILITER(S): .5; 2.5 SOLUTION RESPIRATORY (INHALATION) at 15:27

## 2025-03-23 RX ADMIN — Medication 125 MILLIGRAM(S): at 15:37

## 2025-03-23 NOTE — ED PROVIDER NOTE - CLINICAL SUMMARY MEDICAL DECISION MAKING FREE TEXT BOX
75 y/o male  with hx of COPD, former smoker presents to the ED with episode of SOB, weakness, while at [employer]. States he feels like he has "Water in my mouth". No F/C, no CP, no abd pain, no N/V/D, no urianry sxs, no focal wekaness/numbenss/tingling, no  calf pain, no history of DVT/PE, no recent trauma/travel/surgery, no sick contacts. No orthopnea, no PND, no worsening LE edema. Received duoneb en route and felt better.     On exam, vitals reviewed. Agree with PA exam above. He has symmetric b/l LE edema which is unchanged he says. No calf TTP. Equal pulses throughout. Nonfocal neuro exam. Clear lungs. Heart irregularly irregular.

## 2025-03-23 NOTE — ED PROVIDER NOTE - NSICDXPASTSURGICALHX_GEN_ALL_CORE_FT
PAST SURGICAL HISTORY:  Former smoker     H/O cardiomyopathy     H/O right knee surgery     Hearing loss     History of cardioversion     JESUS (obstructive sleep apnea)      declines

## 2025-03-23 NOTE — ED PROVIDER NOTE - PHYSICAL EXAMINATION
Vital Signs: I have reviewed the initial vital signs.  Constitutional: well-nourished, no acute distress, normocephalic  Eyes: PERRLA, EOM  ENT: MMM,   Cardiovascular: regular rate, regular rhythm, no murmur appreciated  Respiratory: unlabored respiratory effort, wheezing throughout  Musculoskeletal: supple neck,  no bony tenderness  Integumentary: warm, dry, no rash. +b/l lower extremity edema non pitting  Neurologic: awake, alert, steady gait   heme: no adenopathy

## 2025-03-23 NOTE — ED PROVIDER NOTE - OBJECTIVE STATEMENT
73 y/o male  with hx of COPD, former smoker presents to the ED with episode of sob and weakness while at [employer] . patient denies any productive cough, congestion, chest pain . no hemoptysis. no back pain . no palpitations or syncope. no increase in calf pain or swelling

## 2025-03-23 NOTE — ED PROVIDER NOTE - PATIENT PORTAL LINK FT
You can access the FollowMyHealth Patient Portal offered by [facility] by registering at the following website: [email or web address]. By joining [employer]’s FollowMyHealth portal, you will also be able to view your health information using other applications (apps) compatible with our system.

## 2025-03-23 NOTE — ED PROVIDER NOTE - NSFOLLOWUPINSTRUCTIONS_ED_ALL_ED_FT
Our Emergency Department Referral Coordinators will be reaching out to you in the next 24-48 hours from 9:00am to 5:00pm to schedule a follow up appointment. Please expect a phone call from the hospital in that time frame. If you do not receive a call or if you have any questions or concerns, you can reach them at   [phone]      Chronic Obstructive Pulmonary Disease    Chronic obstructive pulmonary disease (COPD) is a lung condition in which airflow from the lungs is limited. Causes include smoking, secondhand smoke exposure, genetics, or recurrent infections. Take all medicines (inhaled or pills) as directed by your health care provider. Avoid exposure to irritants such as smoke, chemicals, and fumes that aggravate your breathing.    If you are a smoker, the most important thing that you can do is stop smoking. Continuing to smoke will cause further lung damage and breathing trouble. Ask your health care provider for help with quitting smoking.    SEEK IMMEDIATE MEDICAL CARE IF YOU HAVE ANY OF THE FOLLOWING SYMPTOMS: shortness of breath at rest or when talking, bluish discoloration of lips, skin, fever, worsening cough, unexplained chest pain, or lightheadedness/dizziness.

## 2025-04-01 ENCOUNTER — APPOINTMENT (OUTPATIENT)
Facility: CLINIC | Age: 75
End: 2025-04-01
Payer: MEDICARE

## 2025-04-01 VITALS
DIASTOLIC BLOOD PRESSURE: 80 MMHG | HEART RATE: 59 BPM | WEIGHT: 197 LBS | BODY MASS INDEX: 34.9 KG/M2 | RESPIRATION RATE: 14 BRPM | OXYGEN SATURATION: 95 % | SYSTOLIC BLOOD PRESSURE: 140 MMHG

## 2025-04-01 DIAGNOSIS — J43.9 EMPHYSEMA, UNSPECIFIED: ICD-10-CM

## 2025-04-01 DIAGNOSIS — R29.818 OTHER SYMPTOMS AND SIGNS INVOLVING THE NERVOUS SYSTEM: ICD-10-CM

## 2025-04-01 PROCEDURE — 99204 OFFICE O/P NEW MOD 45 MIN: CPT

## 2025-04-01 PROCEDURE — G0296 VISIT TO DETERM LDCT ELIG: CPT

## 2025-04-01 RX ORDER — FLUTICASONE FUROATE, UMECLIDINIUM BROMIDE AND VILANTEROL TRIFENATATE 100; 62.5; 25 UG/1; UG/1; UG/1
100-62.5-25 POWDER RESPIRATORY (INHALATION) DAILY
Qty: 1 | Refills: 3 | Status: ACTIVE | COMMUNITY
Start: 2025-04-01 | End: 1900-01-01

## 2025-04-03 ENCOUNTER — OUTPATIENT (OUTPATIENT)
Dept: OUTPATIENT SERVICES | Facility: HOSPITAL | Age: 75
LOS: 1 days | End: 2025-04-03

## 2025-04-03 ENCOUNTER — APPOINTMENT (OUTPATIENT)
Dept: PULMONOLOGY | Facility: HOSPITAL | Age: 75
End: 2025-04-03

## 2025-04-03 DIAGNOSIS — Z92.89 PERSONAL HISTORY OF OTHER MEDICAL TREATMENT: Chronic | ICD-10-CM

## 2025-04-03 DIAGNOSIS — Z87.891 PERSONAL HISTORY OF NICOTINE DEPENDENCE: Chronic | ICD-10-CM

## 2025-04-03 DIAGNOSIS — G47.33 OBSTRUCTIVE SLEEP APNEA (ADULT) (PEDIATRIC): Chronic | ICD-10-CM

## 2025-04-03 DIAGNOSIS — Z98.890 OTHER SPECIFIED POSTPROCEDURAL STATES: Chronic | ICD-10-CM

## 2025-04-03 DIAGNOSIS — J43.9 EMPHYSEMA, UNSPECIFIED: ICD-10-CM

## 2025-04-03 DIAGNOSIS — Z86.79 PERSONAL HISTORY OF OTHER DISEASES OF THE CIRCULATORY SYSTEM: Chronic | ICD-10-CM

## 2025-04-03 DIAGNOSIS — R06.02 SHORTNESS OF BREATH: ICD-10-CM

## 2025-04-03 DIAGNOSIS — H91.90 UNSPECIFIED HEARING LOSS, UNSPECIFIED EAR: Chronic | ICD-10-CM

## 2025-04-03 PROCEDURE — 94727 GAS DIL/WSHOT DETER LNG VOL: CPT | Mod: 26

## 2025-04-03 PROCEDURE — 94060 EVALUATION OF WHEEZING: CPT | Mod: 26

## 2025-04-03 PROCEDURE — 94729 DIFFUSING CAPACITY: CPT | Mod: 26

## 2025-04-03 PROCEDURE — 94664 DEMO&/EVAL PT USE INHALER: CPT

## 2025-04-03 PROCEDURE — 94727 GAS DIL/WSHOT DETER LNG VOL: CPT

## 2025-04-03 PROCEDURE — 94070 EVALUATION OF WHEEZING: CPT

## 2025-04-03 PROCEDURE — 94729 DIFFUSING CAPACITY: CPT

## 2025-04-04 DIAGNOSIS — J43.9 EMPHYSEMA, UNSPECIFIED: ICD-10-CM

## 2025-04-04 DIAGNOSIS — R06.02 SHORTNESS OF BREATH: ICD-10-CM

## 2025-05-08 ENCOUNTER — APPOINTMENT (OUTPATIENT)
Dept: SLEEP CENTER | Facility: HOSPITAL | Age: 75
End: 2025-05-08

## 2025-05-08 ENCOUNTER — OUTPATIENT (OUTPATIENT)
Dept: OUTPATIENT SERVICES | Facility: HOSPITAL | Age: 75
LOS: 1 days | Discharge: HOME | End: 2025-05-08
Payer: MEDICARE

## 2025-05-08 DIAGNOSIS — Z86.79 PERSONAL HISTORY OF OTHER DISEASES OF THE CIRCULATORY SYSTEM: Chronic | ICD-10-CM

## 2025-05-08 DIAGNOSIS — Z87.891 PERSONAL HISTORY OF NICOTINE DEPENDENCE: Chronic | ICD-10-CM

## 2025-05-08 DIAGNOSIS — H91.90 UNSPECIFIED HEARING LOSS, UNSPECIFIED EAR: Chronic | ICD-10-CM

## 2025-05-08 DIAGNOSIS — Z92.89 PERSONAL HISTORY OF OTHER MEDICAL TREATMENT: Chronic | ICD-10-CM

## 2025-05-08 DIAGNOSIS — G47.33 OBSTRUCTIVE SLEEP APNEA (ADULT) (PEDIATRIC): Chronic | ICD-10-CM

## 2025-05-08 DIAGNOSIS — G47.33 OBSTRUCTIVE SLEEP APNEA (ADULT) (PEDIATRIC): ICD-10-CM

## 2025-05-08 DIAGNOSIS — Z98.890 OTHER SPECIFIED POSTPROCEDURAL STATES: Chronic | ICD-10-CM

## 2025-05-08 PROCEDURE — 95810 POLYSOM 6/> YRS 4/> PARAM: CPT | Mod: 26

## 2025-05-08 PROCEDURE — 95810 POLYSOM 6/> YRS 4/> PARAM: CPT

## 2025-05-10 DIAGNOSIS — G47.33 OBSTRUCTIVE SLEEP APNEA (ADULT) (PEDIATRIC): ICD-10-CM

## 2025-06-06 NOTE — HISTORY OF PRESENT ILLNESS
----- Message from Sunny Richardson sent at 6/5/2025 11:28 AM EDT -----  Cholesterol liver kidney sugar thyroid vitamin D levels look good.  Current medications.  ----- Message -----  From: [facility] Results In  Sent: 6/5/2025   5:09 AM EDT  To: Sunny Richardson MD     [FreeTextEntry1] : 70 y/o male with history of HTN, DL, A. fib/flutter, s/p recent admission for A. flutter, s/p ANTHONY cardioversion. No chest pain, dyspnea or syncope. No palpitations. + Edema - increased. Remains in NSR. Hospital records reviewed. last EF 45% by ANTHONY. Repeat echo - normal EF. C/o knee pain.

## 2025-07-17 ENCOUNTER — RX RENEWAL (OUTPATIENT)
Age: 75
End: 2025-07-17